# Patient Record
Sex: MALE | Race: WHITE | URBAN - METROPOLITAN AREA
[De-identification: names, ages, dates, MRNs, and addresses within clinical notes are randomized per-mention and may not be internally consistent; named-entity substitution may affect disease eponyms.]

---

## 2023-07-05 NOTE — LETTER
4/10/2025              Min Munoz Aisha        [address] [facility] [address]         To Whom it may concern    Patient sustained a work-related injury to his right ankle that requires surgery. He will be OFF WORK. We will schedule surgery and we will re-assess him.    Pleases contact us with any questions    Sincerely,    Ronan Kendrick MD          Document electronically generated by:  Ronan Kendrick MD               Patient refused

## 2024-08-14 ENCOUNTER — OFFICE VISIT (OUTPATIENT)
Dept: INTERNAL MEDICINE CLINIC | Facility: CLINIC | Age: 64
End: 2024-08-14
Payer: COMMERCIAL

## 2024-08-14 VITALS
HEIGHT: 68.5 IN | HEART RATE: 62 BPM | SYSTOLIC BLOOD PRESSURE: 132 MMHG | RESPIRATION RATE: 20 BRPM | BODY MASS INDEX: 34.01 KG/M2 | DIASTOLIC BLOOD PRESSURE: 86 MMHG | TEMPERATURE: 98 F | WEIGHT: 227 LBS

## 2024-08-14 DIAGNOSIS — H61.23 BILATERAL IMPACTED CERUMEN: ICD-10-CM

## 2024-08-14 DIAGNOSIS — G47.33 OSA (OBSTRUCTIVE SLEEP APNEA): ICD-10-CM

## 2024-08-14 DIAGNOSIS — Z00.00 ROUTINE PHYSICAL EXAMINATION: Primary | ICD-10-CM

## 2024-08-14 DIAGNOSIS — M51.9 LUMBAR DISC DISEASE: ICD-10-CM

## 2024-08-14 DIAGNOSIS — Z98.1 HISTORY OF LUMBAR FUSION: ICD-10-CM

## 2024-08-14 DIAGNOSIS — Z12.11 COLON CANCER SCREENING: ICD-10-CM

## 2024-08-14 DIAGNOSIS — F41.9 ANXIETY: ICD-10-CM

## 2024-08-14 DIAGNOSIS — H81.10 BENIGN PAROXYSMAL POSITIONAL VERTIGO, UNSPECIFIED LATERALITY: ICD-10-CM

## 2024-08-14 PROCEDURE — 3075F SYST BP GE 130 - 139MM HG: CPT | Performed by: INTERNAL MEDICINE

## 2024-08-14 PROCEDURE — 99386 PREV VISIT NEW AGE 40-64: CPT | Performed by: INTERNAL MEDICINE

## 2024-08-14 PROCEDURE — 3079F DIAST BP 80-89 MM HG: CPT | Performed by: INTERNAL MEDICINE

## 2024-08-14 PROCEDURE — 3008F BODY MASS INDEX DOCD: CPT | Performed by: INTERNAL MEDICINE

## 2024-08-14 RX ORDER — DIAZEPAM 10 MG/1
1 TABLET ORAL 3 TIMES DAILY PRN
COMMUNITY
Start: 2024-05-13 | End: 2024-08-14

## 2024-08-14 RX ORDER — TRAMADOL HYDROCHLORIDE 50 MG/1
50 TABLET ORAL
Qty: 150 TABLET | Refills: 1 | Status: SHIPPED | OUTPATIENT
Start: 2024-08-14

## 2024-08-14 RX ORDER — TRAMADOL HYDROCHLORIDE 50 MG/1
50 TABLET ORAL
COMMUNITY
Start: 2024-08-12 | End: 2024-08-14

## 2024-08-14 RX ORDER — DIAZEPAM 10 MG/1
10 TABLET ORAL 3 TIMES DAILY PRN
Qty: 30 TABLET | Refills: 1 | Status: SHIPPED | OUTPATIENT
Start: 2024-08-14

## 2024-08-14 NOTE — PROGRESS NOTES
HPI:    Patient ID: Min Leonard is a 63 year old male.    HPI   Patient is here to establish care with new primary care physician.  We do have notes available from the previous doctor, Dr. Miguel Karimi. some patient has more disc disease with history of couple of surgeries back in 2004 including a fusion.  Current medications reviewed.  Also with depression, anxiety, sleep apnea. Pt had to change insurance. No EtOH since 1989.     He does not follow woth back or pain doctor. Takes the tramadol daily 5 pills; doing that level for a couple years. He has been on hydrocodone in the past but wants to avoid that now. He is physically active at his work; gets 10K steps a day. He gets vertigo sometimes when he is leaning way back; only happens with certain movements. Episodes last 10-30 seconds. He takes the diazepam for anxiety; history of panic and anxiety dating back for years. Family hx of anxiety with both parents and one sister. Takes the valium 1-2 pills a day; He has been on different anti-depressants but not helpful in the past- Prozac and 4-5 others.     Diet is good. Smoothie for breakfast. Takes a vitamin. He has teeth issues- most will need to be pulled. He has sleep apnea; it improved with a dental brace/appliance- he is not using ti now. Never tolerated CPAP. No tobacco in 2004 at time of fusion. Colonoscopy in Oct 2014.     There is no problem list on file for this patient.         HISTORY:  No past medical history on file.   No past surgical history on file.   No family history on file.   Social History     Socioeconomic History    Marital status:    Tobacco Use    Smoking status: Never    Smokeless tobacco: Never   Substance and Sexual Activity    Alcohol use: Not Currently    Drug use: Never    Sexual activity: Yes     Partners: Female     Social Determinants of Health     Food Insecurity: Low Risk  (9/13/2022)    Received from [facility]    Food Insecurity     Have  there been times that your food ran out, and you didn't have money to get more?: No     Are there times that you worry that this might happen?: No   Transportation Needs: Low Risk  (9/13/2022)    Received from [facility]    Transportation Needs     Do you have trouble getting transportation to medical appointments?: No   Housing Stability: Low Risk  (9/13/2022)    Received from [facility]    Housing Stability     Are you concerned about having a safe and reliable place to live?: No          Review of Systems          Current Outpatient Medications   Medication Sig Dispense Refill    diazePAM 10 MG Oral Tab Take 1 tablet (10 mg total) by mouth 3 (three) times daily as needed.      traMADol 50 MG Oral Tab Take 1 tablet (50 mg total) by mouth 5 (five) times daily.       Allergies:No Known Allergies     PHYSICAL EXAM:   /86 (BP Location: Right arm, Patient Position: Sitting, Cuff Size: large)   Pulse 62   Temp 97.8 °F (36.6 °C) (Other)   Resp 20   Ht 5' 8.5\" (1.74 m)   Wt 227 lb (103 kg)   BMI 34.01 kg/m²      Physical Exam  Constitutional:       Appearance: Normal appearance. He is well-developed. He is obese.   HENT:      Right Ear: There is impacted cerumen.      Left Ear: There is impacted cerumen.      Nose: Nose normal.      Mouth/Throat:      Pharynx: No oropharyngeal exudate or posterior oropharyngeal erythema.   Eyes:      Conjunctiva/sclera: Conjunctivae normal.   Neck:      Vascular: No carotid bruit.   Cardiovascular:      Rate and Rhythm: Normal rate and regular rhythm.      Pulses: Normal pulses.      Heart sounds: Normal heart sounds. No murmur heard.  Pulmonary:      Effort: Pulmonary effort is normal.      Breath sounds: Normal breath sounds. No wheezing or rales.   Abdominal:      General: Bowel sounds are normal.      Palpations: Abdomen is soft. There is no mass.      Tenderness: There is no abdominal tenderness.      Hernia: There is no hernia  in the left inguinal area.   Genitourinary:     Penis: Normal and circumcised.       Testes: Normal.   Musculoskeletal:      Right lower leg: No edema.      Left lower leg: No edema.   Lymphadenopathy:      Cervical: No cervical adenopathy.   Skin:     General: Skin is warm and dry.      Findings: No rash.   Neurological:      General: No focal deficit present.      Mental Status: He is alert.   Psychiatric:         Mood and Affect: Mood normal.         Behavior: Behavior normal.         Thought Content: Thought content normal.          Wt Readings from Last 6 Encounters:   08/14/24 227 lb (103 kg)             ASSESSMENT/PLAN:   1. Routine physical examination  Physical exam remarkable for overweight status as well as poor dentition.  Current medications reviewed.  Health maintenance and vaccination status reviewed.  Encouraged healthy diet and regular exercise.  Try to lose some weight.  He is going to contact us in the coming days and let us know where he will get his blood work drawn at.  After that we will generate the order for full blood test.    2. Lumbar disc disease  History of fusion.  He still has pain but remains quite active.  He takes tramadol 5 pills a day.  Discussed other possible treatments.  We will give him a refill at this time.  We will keep you need to keep close eye to make sure that his level of use does not increase.  - traMADol 50 MG Oral Tab; Take 1 tablet (50 mg total) by mouth 5 (five) times daily.  Dispense: 150 tablet; Refill: 1    3. History of lumbar fusion  As above.  - traMADol 50 MG Oral Tab; Take 1 tablet (50 mg total) by mouth 5 (five) times daily.  Dispense: 150 tablet; Refill: 1    4. Anxiety  Ongoing chronic anxiety with history of panic attacks.  States has been on numerous medications such as Prozac and related pills and they have not been helpful.  We will continue the diazepam.  He takes 1 a day.  - diazePAM 10 MG Oral Tab; Take 1 tablet (10 mg total) by mouth 3 (three)  times daily as needed.  Dispense: 30 tablet; Refill: 1    5. CARLOS (obstructive sleep apnea)  Patient did not tolerate CPAP.    6. Colon cancer screening  Patient is due for colon cancer screening.  Given referral for GI telephone colon screening process.  - [facility] GI Telephone Colon Screen    7. Bilateral impacted cerumen  Patient can return at some point if he wishes to have the ears flushed out.  He is going to try over-the-counter Debrox.  Patient with occasional    8. Benign paroxysmal positional vertigo, unspecified laterality  Brief episodes of vertigo.  Triggered by very precise movements that he does not do all the time.  Discussed the nature of positional vertigo.  I think it is unlikely that clearing the wax will help with the vertigo.  I do not think he needs any medications or physical therapy at this time.  Try to avoid the specific types of movement that trigger this condition.         Meds This Visit:  Requested Prescriptions     Pending Prescriptions Disp Refills    diazePAM 10 MG Oral Tab 90 tablet 1     Sig: Take 1 tablet (10 mg total) by mouth 3 (three) times daily as needed.    traMADol 50 MG Oral Tab 90 tablet 1     Sig: Take 1 tablet (50 mg total) by mouth 5 (five) times daily.       Imaging & Referrals:  None         Min Chaney MD

## 2024-08-15 ENCOUNTER — TELEPHONE (OUTPATIENT)
Dept: INTERNAL MEDICINE CLINIC | Facility: CLINIC | Age: 64
End: 2024-08-15

## 2024-08-15 DIAGNOSIS — Z00.00 ROUTINE PHYSICAL EXAMINATION: Primary | ICD-10-CM

## 2024-08-15 NOTE — TELEPHONE ENCOUNTER
Dr. Chaney Patient stated that he did call his insurance and his lab orders need to go to [email or web address]. Patient also stated that they have to be coded routine or preventative not diagnostic. Also for his colonoscopy it needs to be routine or preventive.     Ok to sent patient a [employer] message once the order has been signed.

## 2024-08-16 NOTE — TELEPHONE ENCOUNTER
Spoke with patient, Date of Birth verified  Patient was informed of MD recommendation, he stated understanding.

## 2024-08-25 LAB
ABSOLUTE BASOPHILS: 57 CELLS/UL (ref 0–200)
ABSOLUTE EOSINOPHILS: 405 CELLS/UL (ref 15–500)
ABSOLUTE LYMPHOCYTES: 2911 CELLS/UL (ref 850–3900)
ABSOLUTE MONOCYTES: 440 CELLS/UL (ref 200–950)
ABSOLUTE NEUTROPHILS: 3287 CELLS/UL (ref 1500–7800)
ALBUMIN/GLOBULIN RATIO: 1.4 (CALC) (ref 1–2.5)
ALBUMIN: 4.3 G/DL (ref 3.6–5.1)
ALKALINE PHOSPHATASE: 52 U/L (ref 35–144)
ALT: 16 U/L (ref 9–46)
AST: 16 U/L (ref 10–35)
BASOPHILS: 0.8 %
BILIRUBIN, TOTAL: 0.4 MG/DL (ref 0.2–1.2)
BUN: 16 MG/DL (ref 7–25)
CALCIUM: 9.6 MG/DL (ref 8.6–10.3)
CARBON DIOXIDE: 29 MMOL/L (ref 20–32)
CHLORIDE: 104 MMOL/L (ref 98–110)
CHOL/HDLC RATIO: 3.3 (CALC)
CHOLESTEROL, TOTAL: 205 MG/DL
CREATININE: 0.84 MG/DL (ref 0.7–1.35)
EGFR: 99 ML/MIN/1.73M2
EOSINOPHILS: 5.7 %
GLOBULIN: 3 G/DL (CALC) (ref 1.9–3.7)
GLUCOSE: 102 MG/DL (ref 65–99)
HDL CHOLESTEROL: 63 MG/DL
HEMATOCRIT: 43.5 % (ref 38.5–50)
HEMOGLOBIN: 14.5 G/DL (ref 13.2–17.1)
LDL-CHOLESTEROL: 122 MG/DL (CALC)
LYMPHOCYTES: 41 %
MCH: 31 PG (ref 27–33)
MCHC: 33.3 G/DL (ref 32–36)
MCV: 93.1 FL (ref 80–100)
MONOCYTES: 6.2 %
MPV: 9.8 FL (ref 7.5–12.5)
NEUTROPHILS: 46.3 %
NON-HDL CHOLESTEROL: 142 MG/DL (CALC)
PLATELET COUNT: 295 THOUSAND/UL (ref 140–400)
POTASSIUM: 4.3 MMOL/L (ref 3.5–5.3)
PROTEIN, TOTAL: 7.3 G/DL (ref 6.1–8.1)
PSA, TOTAL: 1.74 NG/ML
RDW: 12.8 % (ref 11–15)
RED BLOOD CELL COUNT: 4.67 MILLION/UL (ref 4.2–5.8)
SODIUM: 139 MMOL/L (ref 135–146)
TRIGLYCERIDES: 102 MG/DL
TSH W/REFLEX TO FT4: 1.17 MIU/L (ref 0.4–4.5)
WHITE BLOOD CELL COUNT: 7.1 THOUSAND/UL (ref 3.8–10.8)

## 2024-08-26 ENCOUNTER — NURSE ONLY (OUTPATIENT)
Facility: CLINIC | Age: 64
End: 2024-08-26

## 2024-08-26 DIAGNOSIS — Z12.11 COLON CANCER SCREENING: Primary | ICD-10-CM

## 2024-08-26 DIAGNOSIS — Z86.010 HISTORY OF COLON POLYPS: ICD-10-CM

## 2024-08-26 NOTE — PROGRESS NOTES
GI Staff:  [facility] Colon Screening Orders    Please schedule: Colonoscopy 81391 with MAC OR IV (if appropriate)    Please send split dose Golytely bowel prep     Diagnosis: Colon Screening Z12.11 / History of Colon polyps  Z86.010     Medication adjustments: none   Day before procedure, hold:  Day of procedure, hold:     >>>Please inform patient if new medications are started after scheduling procedure they need to call clinic to notify us.

## 2024-08-26 NOTE — PROGRESS NOTES
*Provider per rotation (if necessary)*    Called patient for scheduled telephone colon screening/positive FIT result.   Medications, pharmacy, and allergies verified with the patient.   **Please advise on colonoscopy and bowel prep orders**.     Age 45-64 y/o (Y/N):   66-76 y/o ? Route to GI provider per rotation schedule   › GI MD preference: None   › Insurance:  BLUE CROSS OUT OF STATE   › Last PCP Visit:Min Chaney MD  8/14/2024  IF NO PCP within [facility] system ? Not [facility]/FIT Eligible   › Last CBC drawn: 8/24/24  › Date of positive FIT test: n/a  › H/W/BMI:  5'8.5\" / 227 lbs / 34.01      Special comments/notes:  Recall details:     Last Procedure, Date, MD:     10/22/2014  Edilberto Mabry MD    Last diagnosis: Colon screen , Colon polyps    Recalled for (mth/yrs):  3 years (10/22/2017)   Sedation used previously:  Conscious Sedation    Last Prep Used:    Quality of Prep:      Telephone Colon Screening Questionnaire Yes No   Are you currently experiencing any new/abnormal GI symptoms? [] [x]   If yes, explain:     Rectal bleeding? [] [x]   Black stool? [] [x]   Dysphagia or food \"feeling stuck\" when eating? [] [x]   Intractable vomiting? [] [x]   Unexplained weight loss? [] [x]   First colonoscopy? [] [x]   Family history of colon cancer? [] [x]   Any issues with anesthesia? [] [x]   If yes, explain:      Have you had a stroke, heart attack or stent placement in the last 12 months?  [] [x]   If yes ? GI in-person consultation      Personal history of resp. Issues/oxygen use/CALROS/COPD [x] []   If yes, CPAP/BiPAP? Patient reported No CPAP (Mouth Guard)      History of devices (pacemaker/defibrillator) [] [x]   History of cardiac/CVA issues/(MI/stroke) (see above) [] [x]     Medication usage:  Yes  No   Anticoagulants:  Anticoagulant (Except Aspirin) ? Route to RN staff to obtain ordering provider orders [] [x]   Diabetic Meds:   PO DM Meds ? hold day prior and day of procedure  Insulin ? Route to RN clinical staff to  obtain provider orders  [] [x]   Weight loss meds (Phentermine/Vyvanse/Saxsenda/etc):  [] [x]   Iron/Herbal/Multivitamin Supplement (RX/OTC): [] [x]   Usage of marijuana, CBD &/or vape products: [] [x]

## 2024-11-06 DIAGNOSIS — F41.9 ANXIETY: ICD-10-CM

## 2024-11-06 DIAGNOSIS — Z98.1 HISTORY OF LUMBAR FUSION: ICD-10-CM

## 2024-11-06 DIAGNOSIS — M51.9 LUMBAR DISC DISEASE: ICD-10-CM

## 2024-11-09 RX ORDER — TRAMADOL HYDROCHLORIDE 50 MG/1
50 TABLET ORAL
Qty: 150 TABLET | Refills: 0 | Status: SHIPPED | OUTPATIENT
Start: 2024-11-09

## 2024-11-09 RX ORDER — DIAZEPAM 10 MG/1
10 TABLET ORAL 3 TIMES DAILY PRN
Qty: 30 TABLET | Refills: 0 | Status: SHIPPED | OUTPATIENT
Start: 2024-11-09

## 2024-11-09 NOTE — TELEPHONE ENCOUNTER
Please review. Protocol Failed; No Protocol    DIAZEPAM 10 MG:    Recent fills: 8/15/2024, 9/9/2024, 10/11/2024  Last Rx written: 8/14/2024  Last office visit: 8/14/2024    TRAMADOL 50 MG:      Recent fills: 8/14/2024, 10/11/2024  Last Rx written: 8/14/2024  Last office visit: 8/14/2024    Requested Prescriptions   Pending Prescriptions Disp Refills    DIAZEPAM 10 MG Oral Tab [Pharmacy Med Name: diazePAM Oral Tablet 10 MG] 30 tablet 0     Sig: TAKE ONE TABLET BY MOUTH THREE TIMES DAILY AS NEEDED       Controlled Substance Medication Failed - 11/9/2024 12:13 PM        Failed - This medication is a controlled substance - forward to provider to refill          TRAMADOL 50 MG Oral Tab [Pharmacy Med Name: traMADol HCl Oral Tablet 50 MG] 150 tablet 0     Sig: TAKE 1 TABLET BY MOUTH FIVE TIMES A DAY       Controlled Substance Medication Failed - 11/9/2024 12:13 PM        Failed - This medication is a controlled substance - forward to provider to refill               Future Appointments         Provider Department Appt Notes    In 3 weeks [facility], [facility]          Recent Outpatient Visits              2 months ago Colon cancer screening    [facility]    Nurse Only    2 months ago Routine physical examination    [facility]aney [facility]    Office Visit

## 2024-11-25 RX ORDER — MULTIVITAMIN
1 TABLET ORAL DAILY
COMMUNITY

## 2024-12-04 ENCOUNTER — ANESTHESIA (OUTPATIENT)
Dept: ENDOSCOPY | Facility: HOSPITAL | Age: 64
End: 2024-12-04
Payer: COMMERCIAL

## 2024-12-04 ENCOUNTER — ANESTHESIA EVENT (OUTPATIENT)
Dept: ENDOSCOPY | Facility: HOSPITAL | Age: 64
End: 2024-12-04
Payer: COMMERCIAL

## 2024-12-04 ENCOUNTER — HOSPITAL ENCOUNTER (OUTPATIENT)
Facility: HOSPITAL | Age: 64
Setting detail: HOSPITAL OUTPATIENT SURGERY
Discharge: HOME | End: 2024-12-04
Attending: INTERNAL MEDICINE | Admitting: INTERNAL MEDICINE
Payer: COMMERCIAL

## 2024-12-04 DIAGNOSIS — Z12.11 COLON CANCER SCREENING: ICD-10-CM

## 2024-12-04 DIAGNOSIS — Z86.0100 HISTORY OF COLON POLYPS: ICD-10-CM

## 2024-12-04 PROCEDURE — 0DBK8ZX EXCISION OF ASCENDING COLON, VIA NATURAL OR ARTIFICIAL OPENING ENDOSCOPIC, DIAGNOSTIC: ICD-10-PCS | Performed by: INTERNAL MEDICINE

## 2024-12-04 PROCEDURE — 45380 COLONOSCOPY AND BIOPSY: CPT | Performed by: INTERNAL MEDICINE

## 2024-12-04 RX ORDER — SODIUM CHLORIDE 0.9 % (FLUSH) 0.9 %
10 SYRINGE (ML) INJECTION AS NEEDED
Status: DISCONTINUED | OUTPATIENT
Start: 2024-12-04 | End: 2024-12-04

## 2024-12-04 RX ORDER — SODIUM CHLORIDE, SODIUM LACTATE, POTASSIUM CHLORIDE, CALCIUM CHLORIDE 600; 310; 30; 20 MG/100ML; MG/100ML; MG/100ML; MG/100ML
INJECTION, SOLUTION INTRAVENOUS CONTINUOUS
Status: DISCONTINUED | OUTPATIENT
Start: 2024-12-04 | End: 2024-12-04

## 2024-12-04 RX ORDER — MIDAZOLAM HYDROCHLORIDE 1 MG/ML
1 INJECTION INTRAMUSCULAR; INTRAVENOUS EVERY 5 MIN PRN
Status: DISCONTINUED | OUTPATIENT
Start: 2024-12-04 | End: 2024-12-04

## 2024-12-04 RX ORDER — LIDOCAINE HYDROCHLORIDE 10 MG/ML
INJECTION, SOLUTION EPIDURAL; INFILTRATION; INTRACAUDAL; PERINEURAL AS NEEDED
Status: DISCONTINUED | OUTPATIENT
Start: 2024-12-04 | End: 2024-12-04 | Stop reason: SURG

## 2024-12-04 RX ORDER — NALOXONE HYDROCHLORIDE 0.4 MG/ML
0.08 INJECTION, SOLUTION INTRAMUSCULAR; INTRAVENOUS; SUBCUTANEOUS ONCE AS NEEDED
Status: DISCONTINUED | OUTPATIENT
Start: 2024-12-04 | End: 2024-12-04

## 2024-12-04 RX ADMIN — LIDOCAINE HYDROCHLORIDE 50 MG: 10 INJECTION, SOLUTION EPIDURAL; INFILTRATION; INTRACAUDAL; PERINEURAL at 11:39:00

## 2024-12-04 NOTE — OPERATIVE REPORT
COLONOSCOPY REPORT    Min Leonard     10/3/1960 Age 64 year old   PCP Min Chaney MD Endoscopist Silvana Christianson MD     Date of procedure: 24    Procedure: Colonoscopy w/biopsy    Pre-operative diagnosis: screening    Post-operative diagnosis: see impression    Medications: MAC    Withdrawal time: 16 minutes    Procedure:  Informed consent was obtained from the patient after the risks of the procedure were discussed, including but not limited to bleeding, perforation, aspiration, infection, or possibility of a missed lesion. After discussions of the risks/benefits and alternatives to this procedure, as well as the planned sedation, the patient was placed in the left lateral decubitus position and begun on continuous blood pressure pulse oximetry and EKG monitoring and this was maintained throughout the procedure. Once an adequate level of sedation was obtained a digital rectal exam was completed. Then the lubricated tip of the [id number] diagnostic video colonoscope was inserted and advanced without difficulty to the cecum using the CO2 insufflation technique. The cecum was identified by localizing the trifold, the appendix and the ileocecal valve. Withdrawal was begun with thorough washing and careful examination of the colonic walls and folds. A routine second examination of the cecum/ascending colon was performed. Retroflexion was performed in the rectum. Photodocumentation was obtained. [place] bowel prep score of 6 (Right colon-2; Transverse colon-2, Left colon-2). I then carefully withdrew the instrument from the patient who tolerated the procedure well.     Complications: none.    Findings:   -- KEV: normal rectal tone, no masses palpated.     -- 1 polyp(s) noted as follows:      A. 2 mm polyp in the ascending colon; sessile morphology; cold forceps polypectomy and retrieved.    -- Diverticulosis: mild in the sigmoid colon.    -- A retroflexed view of the rectum revealed no  abnormalities.    -- The colonic mucosa throughout the colon showed normal vascular pattern, without evidence of angioectasias or inflammation.     Impression:   One diminutive polyp resected and retrieved  diverticulosis    Recommend:  Pending pathology, repeat colonoscopy in 5 years given history of polyps  High fiber diet    >>>If tissue was obtained and you have not received your pathology results either by phone or letter within 2 weeks, please call our office at [phone].    Specimens: polyp    Blood loss: <1 ml      Silvana Christianson MD  [facility]

## 2024-12-04 NOTE — DISCHARGE INSTRUCTIONS
Home Care Instructions for Colonoscopy  Diet:  - Resume your regular diet as tolerated unless otherwise instructed.  - Start with light meals to minimize bloating.  - Do not drink alcohol today.    Medication:  - If you have questions about resuming your normal medications, please contact your Primary Care Physician.    Activities:  - Take it easy today. Do not return to work today.  - Do not drive today.  - Do not operate any machinery today (including kitchen equipment).    Colonoscopy:  - You may notice some rectal \"spotting\" (a little blood on the toilet tissue) for a day or two after the exam. This is normal.  - If you experience any rectal bleeding (not spotting), persistent tenderness or sharp severe abdominal pains, oral temperature over 100 degrees Fahrenheit, light-headedness or dizziness, or any other problems, contact your doctor.      **If unable to reach your doctor, please go to the [facility] Emergency Room**    - Your referring physician will receive a full report of your examination.  - If you do not hear from your doctor's office within two weeks of your biopsy, please call them for your results.    You may be able to see your laboratory results in [employer] between 4 and 7 business days.  In some cases, your physician may not have viewed the results before they are released to [employer].  If you have questions regarding your results contact the physician who ordered the test/exam by phone or via [employer] by choosing \"Ask a Medical Question.\"

## 2024-12-04 NOTE — ANESTHESIA PREPROCEDURE EVALUATION
Anesthesia PreOp Note    HPI:     Min Leonard is a 64 year old male who presents for preoperative consultation requested by: Silvana Christianson MD    Date of Surgery: 12/4/2024    Procedure(s):  COLONOSCOPY  Indication: Colon cancer screening/ History of colon polyps    Relevant Problems   No relevant active problems       NPO:  Last Liquid Consumption Date: 12/04/24  Last Liquid Consumption Time: 0930  Last Solid Consumption Date: 12/03/24  Last Solid Consumption Time: 0830  Last Liquid Consumption Date: 12/04/24          History Review:  There are no active problems to display for this patient.      Past Medical History:    Sleep apnea    Visual impairment       Past Surgical History:   Procedure Laterality Date    Colonoscopy  10/22/2014    Edilberto Mabry MD       Prescriptions Prior to Admission[1]  Current Medications and Prescriptions Ordered in Epic[2]    Allergies[3]    Family History   Problem Relation Age of Onset    Colon Cancer Neg     Colon Polyps Neg      Social History     Socioeconomic History    Marital status:    Tobacco Use    Smoking status: Former     Types: Cigarettes    Smokeless tobacco: Never    Tobacco comments:     Quit 2003   Substance and Sexual Activity    Alcohol use: Not Currently    Drug use: Never    Sexual activity: Yes     Partners: Female       Available pre-op labs reviewed.             Vital Signs:  Body mass index is 32.75 kg/m².   height is 1.765 m (5' 9.5\") and weight is 102.1 kg (225 lb). His blood pressure is 139/93 (abnormal) and his pulse is 65. His respiration is 12 and oxygen saturation is 96%.   Vitals:    11/25/24 1626 12/04/24 1002   BP:  (!) 139/93   Pulse:  65   Resp:  12   SpO2:  96%   Weight: 102.1 kg (225 lb)    Height: 1.765 m (5' 9.5\")         Anesthesia Evaluation     Patient summary reviewed and Nursing notes reviewed    No history of anesthetic complications   Airway   Mallampati: III  TM distance: >3 FB  Neck ROM: full  Dental       Comment: Poor dentition      Pulmonary - normal exam    breath sounds clear to auscultation  (+) sleep apnea  Cardiovascular - negative ROS and normal exam  Exercise tolerance: good    Rhythm: regular  Rate: normal    Neuro/Psych - negative ROS     GI/Hepatic/Renal - negative ROS   (+) GERD well controlled, bowel prep    Endo/Other - negative ROS   Abdominal  - normal exam  (+) obese                 Anesthesia Plan:   ASA:  3  Plan:   MAC  Informed Consent Plan and Risks Discussed With:  Patient  Use of Blood Products Discussed With:  Patient  Discussed plan with:  CRNA and surgeon      I have informed Min Leonard and/or legal guardian or family member of the nature of the anesthetic plan, benefits, risks including possible dental damage if relevant, major complications, and any alternative forms of anesthetic management.   All of the patient's questions were answered to the best of my ability. The patient desires the anesthetic management as planned.  Emily Bustamante CRNA  12/4/2024 11:35 AM  Present on Admission:  **None**           [1]   Medications Prior to Admission   Medication Sig Dispense Refill Last Dose/Taking    Multiple Vitamin (ONE-DAILY MULTI VITAMINS) Oral Tab Take 1 tablet by mouth daily.   11/30/2024    diazePAM 10 MG Oral Tab Take 1 tablet (10 mg total) by mouth 3 (three) times daily as needed. 30 tablet 0 12/3/2024    traMADol 50 MG Oral Tab Take 1 tablet (50 mg total) by mouth 5 (five) times daily. 150 tablet 0 12/4/2024 Morning   [2]   Current Facility-Administered Medications Ordered in Epic   Medication Dose Route Frequency Provider Last Rate Last Admin    sodium chloride 0.9% 0.9% flush injection 10 mL  10 mL Intravenous PRN Silvana Christianson MD        lactated ringers infusion   Intravenous Continuous Silvana Christianson  mL/hr at 12/04/24 1009 New Bag at 12/04/24 1009    midazolam (Versed) 2 MG/2ML injection 1 mg  1 mg Intravenous Q5 Min PRN Silvana Christianson MD         fentaNYL (Sublimaze) 50 mcg/mL injection 25 mcg  25 mcg Intravenous Q5 Min PRN Silvana Christianson MD         No current [facility]-ordered outpatient medications on file.   [3] No Known Allergies

## 2024-12-04 NOTE — ANESTHESIA POSTPROCEDURE EVALUATION
Patient: Min Leonadr    Procedure Summary       Date: 12/04/24 Room / Location: [facility] ENDOSCOPY 01 / [facility] ENDOSCOPY    Anesthesia Start: 1137 Anesthesia Stop: 1202    Procedure: COLONOSCOPY Diagnosis:       Colon cancer screening      History of colon polyps      (Diverticulosis, colon polyp)    Surgeons: Silvana Christianson MD Anesthesiologist: Emily Bustamante CRNA    Anesthesia Type: MAC ASA Status: 3            Anesthesia Type: MAC    Vitals Value Taken Time    64 12/04/24 1202   Temp 97 12/04/24 1202   Pulse 66 12/04/24 1201   Resp 10 12/04/24 1201   SpO2 95 % 12/04/24 1201   Vitals shown include unfiled device data.    [facility] AN Post Evaluation:   Patient Evaluated in PACU  Patient Participation: complete - patient participated  Level of Consciousness: awake and alert  Pain Score: 0  Pain Management: adequate  Airway Patency:patent  Yes    Nausea/Vomiting: none  Cardiovascular Status: acceptable  Respiratory Status: acceptable  Postoperative Hydration acceptable      Emily Bustamante CRNA  12/4/2024 12:02 PM

## 2024-12-04 NOTE — H&P
History & Physical Examination    Patient Name: Mni Leonard  MRN: [id number]  CSN: [id number]  YOB: 1960    Diagnosis: crc screening    Prescriptions Prior to Admission[1]  Current Facility-Administered Medications   Medication Dose Route Frequency    sodium chloride 0.9% 0.9% flush injection 10 mL  10 mL Intravenous PRN    lactated ringers infusion   Intravenous Continuous    midazolam (Versed) 2 MG/2ML injection 1 mg  1 mg Intravenous Q5 Min PRN    fentaNYL (Sublimaze) 50 mcg/mL injection 25 mcg  25 mcg Intravenous Q5 Min PRN       Allergies: Allergies[2]    Past Medical History:    Sleep apnea    Visual impairment     Past Surgical History:   Procedure Laterality Date    Colonoscopy  10/22/2014    Edilberto Mabry MD     Family History   Problem Relation Age of Onset    Colon Cancer Neg     Colon Polyps Neg      Social History     Tobacco Use    Smoking status: Former     Types: Cigarettes    Smokeless tobacco: Never    Tobacco comments:     Quit 2003   Substance Use Topics    Alcohol use: Not Currently         SYSTEM Check if Review is Normal Check if Physical Exam is Normal If not normal, please explain:   HEENT [X ] [ X]    NECK  [X ] [ X]    HEART [X ] [ X]    LUNGS [X ] [ X]    ABDOMEN [X ] [ X]    EXTREMITIES [X ] [ X]    OTHER        I have discussed the risks and benefits and alternatives of the procedure with the patient/family.  They understand and agree to proceed with plan of care.   I have reviewed the History and Physical done within the last 30 days.  Any changes noted above.    Silvana Christianson MD  [facility]  12/4/2024  10:21 AM               [1]   Medications Prior to Admission   Medication Sig Dispense Refill Last Dose/Taking    Multiple Vitamin (ONE-DAILY MULTI VITAMINS) Oral Tab Take 1 tablet by mouth daily.   11/30/2024    diazePAM 10 MG Oral Tab Take 1 tablet (10 mg total) by mouth 3 (three) times daily as needed. 30 tablet 0 12/3/2024     traMADol 50 MG Oral Tab Take 1 tablet (50 mg total) by mouth 5 (five) times daily. 150 tablet 0 12/4/2024 Morning   [2] No Known Allergies

## 2024-12-05 VITALS
WEIGHT: 225 LBS | SYSTOLIC BLOOD PRESSURE: 131 MMHG | BODY MASS INDEX: 32.58 KG/M2 | HEIGHT: 69.5 IN | DIASTOLIC BLOOD PRESSURE: 81 MMHG | OXYGEN SATURATION: 96 % | RESPIRATION RATE: 13 BRPM | HEART RATE: 58 BPM

## 2024-12-09 ENCOUNTER — MED REC SCAN ONLY (OUTPATIENT)
Facility: CLINIC | Age: 64
End: 2024-12-09

## 2024-12-09 DIAGNOSIS — M51.9 LUMBAR DISC DISEASE: ICD-10-CM

## 2024-12-09 DIAGNOSIS — Z98.1 HISTORY OF LUMBAR FUSION: ICD-10-CM

## 2024-12-09 DIAGNOSIS — F41.9 ANXIETY: ICD-10-CM

## 2024-12-10 RX ORDER — TRAMADOL HYDROCHLORIDE 50 MG/1
50 TABLET ORAL
Qty: 150 TABLET | Refills: 0 | OUTPATIENT
Start: 2024-12-10

## 2024-12-10 RX ORDER — DIAZEPAM 10 MG/1
10 TABLET ORAL 3 TIMES DAILY PRN
Qty: 30 TABLET | Refills: 0 | OUTPATIENT
Start: 2024-12-10

## 2024-12-10 NOTE — TELEPHONE ENCOUNTER
This medication called the [facility] to verify if patient picked up the Tramadol prescription written 8/14/24 for #150 x 30 days. Patient also filled at [facility] 8/12/24.    Spoke with the pharmacist, Myles. She states that the prescription written 8/14/2024 for #150 x 30 days was picked up 8/16/24.    The pharmacist stated the patient did use his [employer] insurance to pick his prescription up.    I did advise the pharmacist that patient also filled a 30 day supply at [facility] 8/12/24.    I verified with the pharmacist, and she states that they do use the [facility]. Not sure how it got missed.    Advised the pharmacist that I will bring this information to the provider's attention.

## 2024-12-10 NOTE — TELEPHONE ENCOUNTER
Dr. Chaney -     I have a safety concern regarding this patient's tramadol usage.  Please also review how frequently the patient should be taking Valium.    Please advise this patient is refilling his tramadol prescription inappropriately.     **PLEASE ADVISE INFORMATION BELOW - PATIENT IS REFILLING HIS TRAMADOL AT 2 PHARMACIES ([facility] & [facility]) PATIENT FILLED TWICE IN AUGUST 2024, 2 DAYS APART.    Per office visit with Dr. Chaney   History of fusion.  He still has pain but remains quite active.  He takes tramadol 5 pills a day.  Discussed other possible treatments.  We will give him a refill at this time.  We will keep you need to keep close eye to make sure that his level of use does not increase.  He takes the diazepam for anxiety; history of panic and anxiety dating back for years   Takes the valium 1-2 pills a day   We will continue the diazepam.  He takes 1 a day.

## 2024-12-10 NOTE — TELEPHONE ENCOUNTER
Patient requesting for refill on medications and if possible more than 1 month supply at a time.  Patient states he is out of medication.   Routed to Refill team.

## 2024-12-10 NOTE — TELEPHONE ENCOUNTER
This medication technician is calling [facility] to verify patient's tramadol dispense history, as patient filled a 30 day supply through [facility] 8/16/24.    Spoke with Rosanna, Pharmacist and she confirmed his last dispense date, which was 11/11/24 for #150 x 30 days.    I asked Rosanna if she could verify the prescription written on 8/12/24 #150 x 30 days by Dr. Miguel Colindres. Rosanan confirmed this information.    Other information below was also confirmed with the pharmacist:     - That patient did in fact pick this one up on 8/12/24     - Confirmed that patient ALWAYS uses a discount card, not insurance.    The  pharmacist stated that they are aware of this patient and keep an eye his dispense history for controls/narcotics.    I advised the Pharmacist that will bring this information to the providers attention.

## 2024-12-10 NOTE — TELEPHONE ENCOUNTER
8/12/24: Patient refilled and picked up at [facility] for tramadol 50 mg prescription written by Dr. Moffett for a full qty of #150 x 30 days (prescription written 8/12/24)       - THE PHARMACIST STATES THIS PATIENT ALWAYS USES A DISCOUNT CARD, NEVER INSURANCE.      8/16/24: Patient refilled and picked up at [facility] for tramadol 50 mg prescription written by Dr. Chaney for a full qty #150 x 30 day (prescription written on 8/14/24)      - THE PHARMACIST STATES THAT PATIENT USED HIS [employer] INSURANCE TO FILL THIS PRESCRIPTION.        TRAMADOL 50 mg:  Recent fill dates:   11/11/24, 10/11/24, and 8/14/24 (Written by Dr. Chaney)  Other fill dates:  8/12/24, 7/15/24, and 6/17/24 (Written by Dr. Miguel Moffett)  Date of  last written prescription:      Prescription written on 11/9/24 for qty of: #150 each (processed as a 30 day supply)       - Taken 5 times daily as needed    TraMADol HCl Dispensed Written Strength Quantity Refills Days Supply Provider Pharmacy   TRAMADOL HCL 11/11/2024 11/09/2024 50 mg 150  30 NAYELI CHANEY MD [facility]   TRAMADOL HCL 10/11/2024 08/14/2024 50 mg 150  30 NAYELI CHANEY MD [facility]   TRAMADOL HCL 08/14/2024 08/14/2024 50 mg 150  30 NAYELI CHANEY MD [facility]   TRAMADOL HCL 08/12/2024 08/12/2024 50 mg 150  30 MIGUEL MOFFETT DO [facility]      _______________________________________________________________________________________    Diazepam 10 mg:   Recent fill dates:   11/11/24, 10/11/24, and 9/9/24 (Written for #90 x 30 days - Dr. Moffett). Patient also refilled on 8/15/24 (written for #30 x 10 days - Dr. Chaney)  Date of  last written prescription:      Prescription written on 11/9/24 for qty of: #30 each (processed as a 10 day supply)       - Taken three times daily as needed    Requested Prescriptions     Pending Prescriptions Disp Refills    TRAMADOL 50 MG Oral Tab [Pharmacy Med Name: traMADol HCl Oral Tablet 50 MG] 150 tablet 0     Sig: Take 1 tablet (50 mg  total) by mouth 5 (five) times daily.    DIAZEPAM 10 MG Oral Tab [Pharmacy Med Name: diazePAM Oral Tablet 10 MG] 30 tablet 0     Sig: Take 1 tablet (10 mg total) by mouth 3 (three) times daily as needed.

## 2024-12-10 NOTE — TELEPHONE ENCOUNTER
Sending high priority, but these refill requests need to be reviewed by primary care provider.    - Dr. Chaney is out of office 12/10/24, but back in office 12/11/24

## 2024-12-11 RX ORDER — TRAMADOL HYDROCHLORIDE 50 MG/1
50 TABLET ORAL
Qty: 150 TABLET | Refills: 0 | Status: SHIPPED | OUTPATIENT
Start: 2024-12-11

## 2024-12-11 RX ORDER — DIAZEPAM 10 MG/1
10 TABLET ORAL 3 TIMES DAILY PRN
Qty: 30 TABLET | Refills: 0 | Status: SHIPPED | OUTPATIENT
Start: 2024-12-11

## 2024-12-15 NOTE — PROGRESS NOTES
Dear Min,    I reviewed the pathology report from the polyp(s) we completely removed during your recent colonoscopy. The polyp removed was a right sided hyperplastic polyp, which is a benign growth. However, these are the types of polyps that if not removed could become a colon cancer. All of your polyps were completely removed.     The current health care guidelines recommend that patients with the size, number, and types of polyps you have should repeat their colonoscopy in 5 years to look for any new polyps that might have grown.    If you have further questions please call me at [phone] or message me through [employer].    Sincerely,   Silvana Christianson MD

## 2024-12-17 ENCOUNTER — TELEPHONE (OUTPATIENT)
Facility: CLINIC | Age: 64
End: 2024-12-17

## 2024-12-17 NOTE — TELEPHONE ENCOUNTER
Recall colonoscopy in 5 years per Dr. Christianson. Colonoscopy done on 12/4/24.    Health maintenance updated and message sent to pt outreach to repeat colon in 5 years.    [employer] message sent by MD.

## 2024-12-17 NOTE — TELEPHONE ENCOUNTER
----- Message from Silvana Cuadra Ma sent at 12/15/2024  2:56 PM CST -----  Dear Min,    I reviewed the pathology report from the polyp(s) we completely removed during your recent colonoscopy. The polyp removed was a right sided hyperplastic polyp, which is a benign growth. However, these are the types of polyps that if not removed could become a colon cancer. All of your polyps were completely removed.     The current health care guidelines recommend that patients with the size, number, and types of polyps you have should repeat their colonoscopy in 5 years to look for any new polyps that might have grown.    If you have further questions please call me at [phone] or message me through [employer].    Sincerely,   Silvana Christianson MD

## 2025-01-06 DIAGNOSIS — Z98.1 HISTORY OF LUMBAR FUSION: ICD-10-CM

## 2025-01-06 DIAGNOSIS — M51.9 LUMBAR DISC DISEASE: ICD-10-CM

## 2025-01-06 DIAGNOSIS — F41.9 ANXIETY: ICD-10-CM

## 2025-01-09 RX ORDER — TRAMADOL HYDROCHLORIDE 50 MG/1
50 TABLET ORAL
Qty: 150 TABLET | Refills: 0 | Status: SHIPPED | OUTPATIENT
Start: 2025-01-09

## 2025-01-09 RX ORDER — DIAZEPAM 10 MG/1
10 TABLET ORAL 3 TIMES DAILY PRN
Qty: 30 TABLET | Refills: 0 | Status: SHIPPED | OUTPATIENT
Start: 2025-01-09

## 2025-01-09 NOTE — TELEPHONE ENCOUNTER
Please review; protocol failed/No Protocol    Tramadol and Diazepam   Recent Fills: 10/11/2024, 11/11/2024, 12/11/2024    Last Rx Written: 12/11/2024    Last Office Visit: 08/14/2024    Requested Prescriptions   Pending Prescriptions Disp Refills    traMADol 50 MG Oral Tab 150 tablet 0     Sig: Take 1 tablet (50 mg total) by mouth 5 (five) times daily.       Controlled Substance Medication Failed - 1/9/2025 12:23 PM        Failed - This medication is a controlled substance - forward to provider to refill        Passed - Medication is active on med list          diazePAM 10 MG Oral Tab 30 tablet 0     Sig: Take 1 tablet (10 mg total) by mouth 3 (three) times daily as needed.       Controlled Substance Medication Failed - 1/9/2025 12:23 PM        Failed - This medication is a controlled substance - forward to provider to refill        Passed - Medication is active on med list             Recent Outpatient Visits              4 months ago Colon cancer screening    [facility]    Nurse Only    4 months ago Routine physical examination    [facility] Min Chaney MD    Office Visit

## 2025-01-31 DIAGNOSIS — M51.9 LUMBAR DISC DISEASE: ICD-10-CM

## 2025-01-31 DIAGNOSIS — Z98.1 HISTORY OF LUMBAR FUSION: ICD-10-CM

## 2025-01-31 DIAGNOSIS — F41.9 ANXIETY: ICD-10-CM

## 2025-02-04 RX ORDER — TRAMADOL HYDROCHLORIDE 50 MG/1
50 TABLET ORAL
Qty: 150 TABLET | Refills: 0 | Status: SHIPPED | OUTPATIENT
Start: 2025-02-04

## 2025-02-04 RX ORDER — DIAZEPAM 10 MG/1
10 TABLET ORAL 3 TIMES DAILY PRN
Qty: 30 TABLET | Refills: 0 | Status: SHIPPED | OUTPATIENT
Start: 2025-02-04

## 2025-02-04 NOTE — TELEPHONE ENCOUNTER
Please review; protocol failed/ has no protocol      Fill dates for Diazepam    Recent fills: 01/09/2025 12/11/2024,11/11/2024,  Last Rx written: 01/09/2025  Last Office Visit: 08/14/2024    Recent Visits  Date Type Provider Dept   08/14/24 Office Visit Min Chaney MD [facility]     Fill dates for Tramadol    Recent fills: 01/10/2025,12/11/2024,11/11/2024  Last Rx written: 01/09/2025  Last Office Visit: 08/14/2024    Recent Visits  Date Type Provider Dept   08/14/24 Office Visit Min Chaney MD [facility]         Requested Prescriptions   Pending Prescriptions Disp Refills    traMADol 50 MG Oral Tab 150 tablet 0     Sig: Take 1 tablet (50 mg total) by mouth 5 (five) times daily.       Controlled Substance Medication Failed - 2/4/2025 11:46 AM        Failed - This medication is a controlled substance - forward to provider to refill        Passed - Medication is active on med list          diazePAM 10 MG Oral Tab 30 tablet 0     Sig: Take 1 tablet (10 mg total) by mouth 3 (three) times daily as needed.       Controlled Substance Medication Failed - 2/4/2025 11:46 AM        Failed - This medication is a controlled substance - forward to provider to refill        Passed - Medication is active on med list           Recent Outpatient Visits              5 months ago Colon cancer screening    [facility]    Nurse Only    5 months ago Routine physical examination    [facility] Min Chaney MD    Office Visit

## 2025-02-27 DIAGNOSIS — Z98.1 HISTORY OF LUMBAR FUSION: ICD-10-CM

## 2025-02-27 DIAGNOSIS — M51.9 LUMBAR DISC DISEASE: ICD-10-CM

## 2025-02-27 DIAGNOSIS — F41.9 ANXIETY: ICD-10-CM

## 2025-03-04 RX ORDER — TRAMADOL HYDROCHLORIDE 50 MG/1
50 TABLET ORAL
Qty: 150 TABLET | Refills: 0 | Status: SHIPPED | OUTPATIENT
Start: 2025-03-04

## 2025-03-04 RX ORDER — DIAZEPAM 10 MG/1
10 TABLET ORAL 3 TIMES DAILY PRN
Qty: 30 TABLET | Refills: 0 | Status: SHIPPED | OUTPATIENT
Start: 2025-03-04

## 2025-03-04 NOTE — TELEPHONE ENCOUNTER
Please review; protocol failed/No Protocol      Recent Fills:   Diazepam: 11/11/2024, 12/11/2024, 02/05/2025  Tramadol: 12/11/2024, 01/10/2025, 02/06/2025-due 03/11/2025    Last Rx Written:   Diazepam: 02/04/2025  Tramadol: 02/04/2025    Last Office Visit: 08/14/2024

## 2025-03-26 DIAGNOSIS — F41.9 ANXIETY: ICD-10-CM

## 2025-03-26 DIAGNOSIS — Z98.1 HISTORY OF LUMBAR FUSION: ICD-10-CM

## 2025-03-26 DIAGNOSIS — M51.9 LUMBAR DISC DISEASE: ICD-10-CM

## 2025-03-31 NOTE — TELEPHONE ENCOUNTER
Please kindly review this medication    **Patient is not due for a refill on their tramadol until 4/11/25 (this information is based on the last 6 dispenses recorded from the [facility]). Please advise**    Is patient's diazepam supposed to last patient 30 days? If patient is to fill every 30 days, patient is not due for a refill until 4/11/25      [x] FAILS PROTOCOL            [x] Controlled Substance             [] Medication not previously prescribed by Provider            [] Due for appointment- no future appointment scheduled            [] BP reading            [] Labs            [] Depression Screening            [] Asthma (ACT recorded/ACT score)    [] HAS NO PROTOCOL ATTACHED     TRAMADOL:   Recent fill dates: 12/11/24, 1/10/25, 2/6/25, and 3/4/25  Date of  last written prescription: 3/4/25   Last written quantity: #150 each and processed as a 30 day supply  Refill date: 04/11/2025  LAST OFFICE VISIT: 8/14/24  [x] Takes as needed  [] Takes scheduled daily    DIAZEPAM:  Recent fill dates: 12/11/24, 1/10/25, 2/4/25, and 3/4/25  Date of  last written prescription: 3/4/25   Last written quantity: #30 each and processed as a 10 day supply  Refill date: 4/1/2025  LAST OFFICE VISIT: 8/14/24  [x] Takes as needed  [] Takes scheduled daily

## 2025-04-03 RX ORDER — TRAMADOL HYDROCHLORIDE 50 MG/1
50 TABLET ORAL
Qty: 150 TABLET | Refills: 0 | Status: SHIPPED | OUTPATIENT
Start: 2025-04-03

## 2025-04-03 RX ORDER — DIAZEPAM 10 MG/1
10 TABLET ORAL 3 TIMES DAILY PRN
Qty: 30 TABLET | Refills: 0 | Status: SHIPPED | OUTPATIENT
Start: 2025-04-03

## 2025-04-03 NOTE — TELEPHONE ENCOUNTER
Patient is calling for status of his medication refill request.  Per the patient he will be out of medication by Sunday 4-6-25. Patient states that the pharmacy is closed on Sunday. Patient can be reached at [phone].

## 2025-04-07 ENCOUNTER — TELEPHONE (OUTPATIENT)
Facility: CLINIC | Age: 65
End: 2025-04-07

## 2025-04-07 ENCOUNTER — TELEPHONE (OUTPATIENT)
Dept: INTERNAL MEDICINE CLINIC | Facility: CLINIC | Age: 65
End: 2025-04-07

## 2025-04-07 ENCOUNTER — OFFICE VISIT (OUTPATIENT)
Dept: INTERNAL MEDICINE CLINIC | Facility: CLINIC | Age: 65
End: 2025-04-07

## 2025-04-07 VITALS
SYSTOLIC BLOOD PRESSURE: 112 MMHG | HEART RATE: 76 BPM | HEIGHT: 69 IN | BODY MASS INDEX: 31.84 KG/M2 | DIASTOLIC BLOOD PRESSURE: 78 MMHG | WEIGHT: 215 LBS | RESPIRATION RATE: 16 BRPM | TEMPERATURE: 98 F

## 2025-04-07 DIAGNOSIS — M79.604 PAIN OF RIGHT LOWER EXTREMITY: Primary | ICD-10-CM

## 2025-04-07 DIAGNOSIS — T14.90XA INJURY: ICD-10-CM

## 2025-04-07 DIAGNOSIS — M25.571 ACUTE RIGHT ANKLE PAIN: Primary | ICD-10-CM

## 2025-04-07 DIAGNOSIS — M25.571 ACUTE RIGHT ANKLE PAIN: ICD-10-CM
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
## 2025-04-07 DIAGNOSIS — M25.571 RIGHT ANKLE PAIN, UNSPECIFIED CHRONICITY: ICD-10-CM

## 2025-04-07 DIAGNOSIS — M51.9 LUMBAR DISC DISEASE: ICD-10-CM

## 2025-04-07 DIAGNOSIS — Z09 HOSPITAL DISCHARGE FOLLOW-UP: Primary | ICD-10-CM

## 2025-04-07 NOTE — PROGRESS NOTES
iMn Leonard is a 64 year old male.  Chief Complaint   Patient presents with    ER F/U     Ankle sprain Workmens Comp case     HPI:   He presents for ER follow up. He went to the ER after having a fall at work.  He had right ankle pain after the fall. He was taken to [facility] emergency for evaluation. He had an right ankle x-ray completed which showed no fracture or dislocation.     He walked on to a wet floor while at work and he slipped. He heard his right ankle \"snap\" and had immediate pain. The fall happened on 3/20.     He installs toll transponders in cars. He walks about 6k-16K steps a day. He is also in and out of vehicles throughout the day. Currently he is not able to perform his job duties.     He is currently wearing a walking boot. He has been icing the right ankle. He continues to have pain. He is taking tramadol as needed. He takes this currently for back pain as well. He is also taking ibuprofen as needed.     He is having right ankle pain with flexion and extension. He rates the pain an 8/10. There is swelling present.     He is requesting a referral for ortho and a letter for work.     Current Outpatient Medications   Medication Sig Dispense Refill    traMADol 50 MG Oral Tab Take 1 tablet (50 mg total) by mouth 5 (five) times daily. 150 tablet 0    diazePAM 10 MG Oral Tab Take 1 tablet (10 mg total) by mouth 3 (three) times daily as needed. 30 tablet 0    Multiple Vitamin (ONE-DAILY MULTI VITAMINS) Oral Tab Take 1 tablet by mouth daily.        Past Medical History:    Sleep apnea    Visual impairment      Past Surgical History:   Procedure Laterality Date    Colonoscopy  10/22/2014    Edilberto Mabry MD    Colonoscopy N/A 12/04/2024    Silvana Christianson MD    Colonoscopy N/A 12/4/2024    Procedure: COLONOSCOPY;  Surgeon: Silvana Christianson MD;  Location: [facility] ENDOSCOPY      Social History:  Social History     Socioeconomic History    Marital status:    Tobacco Use    Smoking status:  Former     Types: Cigarettes    Smokeless tobacco: Never    Tobacco comments:     Quit 2003   Substance and Sexual Activity    Alcohol use: Not Currently    Drug use: Never    Sexual activity: Yes     Partners: Female     Social Drivers of Health     Food Insecurity: No Food Insecurity (4/7/2025)    NCSS - Food Insecurity     Worried About Running Out of Food in the Last Year: No     Ran Out of Food in the Last Year: No   Transportation Needs: No Transportation Needs (4/7/2025)    NCSS - Transportation     Lack of Transportation: No   Housing Stability: Not At Risk (4/7/2025)    NCSS - Housing/Utilities     Has Housing: Yes     Worried About Losing Housing: No     Unable to Get Utilities: No      Family History   Problem Relation Age of Onset    Colon Cancer Neg     Colon Polyps Neg       Allergies[1]     REVIEW OF SYSTEMS:     Review of Systems   Constitutional:  Negative for fever.   HENT: Negative.     Respiratory:  Negative for cough, shortness of breath and wheezing.    Cardiovascular:  Negative for chest pain.   Gastrointestinal: Negative.    Genitourinary: Negative.    Musculoskeletal:  Positive for arthralgias (right ankle) and gait problem.   Skin: Negative.    Psychiatric/Behavioral: Negative.        Wt Readings from Last 5 Encounters:   04/07/25 215 lb (97.5 kg)   11/25/24 225 lb (102.1 kg)   08/14/24 227 lb (103 kg)     Body mass index is 31.75 kg/m².      EXAM:   /78 (BP Location: Right arm, Patient Position: Sitting, Cuff Size: large)   Pulse 76   Temp 98.1 °F (36.7 °C) (Temporal)   Resp 16   Ht 5' 9\" (1.753 m)   Wt 215 lb (97.5 kg)   BMI 31.75 kg/m²     Physical Exam  Vitals reviewed.   Constitutional:       Appearance: Normal appearance.   HENT:      Head: Normocephalic.   Cardiovascular:      Rate and Rhythm: Normal rate and regular rhythm.      Pulses: Normal pulses.   Pulmonary:      Breath sounds: Normal breath sounds. No wheezing.   Musculoskeletal:         General: No swelling.       Right ankle: Swelling present. Tenderness present. Decreased range of motion.   Skin:     General: Skin is warm and dry.   Neurological:      Mental Status: He is alert and oriented to person, place, and time.   Psychiatric:         Mood and Affect: Mood normal.         Behavior: Behavior normal.            ASSESSMENT AND PLAN:   1. Hospital discharge follow-up  - hospital notes and imaging reviewed     2. Acute right ankle pain  - continue to wear walking boot with weight bearing   - continue ibuprofen as needed for mild pain  - continue tramadol for severe pain   - Ortho Referral - In Network  - letter given per patient request   - patient to remain off from work until evaluated by ortho     3. Fall, sequela  - mechanical fall at work  - patient slipped on a wet floor   - cause of #2      The patient indicates understanding of these issues and agrees to the plan.  Return for if symptoms do not resolve.       [1] No Known Allergies

## 2025-04-07 NOTE — TELEPHONE ENCOUNTER
Patient calling to schedule for right knee to ankle injury. Patient states it started as a bad sprain, workmans comp injury from 3/20 and is not getting better.   Please advise who would be best to see pt for this concern.   Patient states the pain is in the calf, below the knee, all the way to the ankle.

## 2025-04-07 NOTE — TELEPHONE ENCOUNTER
Patient called stating he was seen by Didi Hoskins today and given a referral for Edouard Shoemaker and he was informed that he is a specialist for knees and he doesn't see patients for ankle injures, patient is requesting a new referral.

## 2025-04-07 NOTE — TELEPHONE ENCOUNTER
Spoke with patient. WC injury. Scheduled him for 4/10/25 at 11:15 am. Location, directions and MD name given. He will get referral if needed. X-rays ordered as he does not have disc and feels that the ones taken may have missed something as he still has pain that he feels above ankle. Advised to come in early and get imaging done prior. He is amenable to plan

## 2025-04-10 ENCOUNTER — HOSPITAL ENCOUNTER (OUTPATIENT)
Dept: GENERAL RADIOLOGY | Age: 65
Discharge: HOME | End: 2025-04-10
Attending: STUDENT IN AN ORGANIZED HEALTH CARE EDUCATION/TRAINING PROGRAM
Payer: OTHER MISCELLANEOUS

## 2025-04-10 ENCOUNTER — OFFICE VISIT (OUTPATIENT)
Dept: ORTHOPEDICS CLINIC | Facility: CLINIC | Age: 65
End: 2025-04-10
Payer: OTHER MISCELLANEOUS

## 2025-04-10 DIAGNOSIS — M25.561 ACUTE PAIN OF RIGHT KNEE: ICD-10-CM

## 2025-04-10 DIAGNOSIS — T14.90XA INJURY: ICD-10-CM

## 2025-04-10 DIAGNOSIS — M79.604 PAIN OF RIGHT LOWER EXTREMITY: ICD-10-CM

## 2025-04-10 DIAGNOSIS — M25.571 RIGHT ANKLE PAIN, UNSPECIFIED CHRONICITY: ICD-10-CM

## 2025-04-10 DIAGNOSIS — M25.571 RIGHT ANKLE PAIN, UNSPECIFIED CHRONICITY: Primary | ICD-10-CM

## 2025-04-10 PROCEDURE — 73562 X-RAY EXAM OF KNEE 3: CPT | Performed by: STUDENT IN AN ORGANIZED HEALTH CARE EDUCATION/TRAINING PROGRAM

## 2025-04-10 PROCEDURE — 73610 X-RAY EXAM OF ANKLE: CPT | Performed by: STUDENT IN AN ORGANIZED HEALTH CARE EDUCATION/TRAINING PROGRAM

## 2025-04-10 PROCEDURE — 73590 X-RAY EXAM OF LOWER LEG: CPT | Performed by: STUDENT IN AN ORGANIZED HEALTH CARE EDUCATION/TRAINING PROGRAM

## 2025-04-10 PROCEDURE — 73600 X-RAY EXAM OF ANKLE: CPT | Performed by: STUDENT IN AN ORGANIZED HEALTH CARE EDUCATION/TRAINING PROGRAM

## 2025-04-14 ENCOUNTER — TELEPHONE (OUTPATIENT)
Facility: CLINIC | Age: 65
End: 2025-04-14

## 2025-04-14 NOTE — PROGRESS NOTES
[facility]  [address] [facility] [address]  [address]  [phone]     NURSING INTAKE COMMENTS:   Chief Complaint   Patient presents with    Ankle Injury     Worker comp consult for right ankle and leg pain 5-10/10 worse on certain ROM due to injury on 3/20/2025. He felt down at work ( [employer])            The following individual(s) verbally consented to be recorded using ambient AI listening technology and understand that they can each withdraw their consent to this listening technology at any point by asking the clinician to turn off or pause the recording:    Patient name: Min Leonard      HPI:   History of Present Illness  Mr. Min Leonard is a 64 year old male who presents with right leg pain following a fall. He was referred by Dr. Almanzar for further evaluation.    On March 20, he experienced a fall, landing on his left side, and heard a 'snap' sound. He had immediate severe pain, rated as 10 out of 10, and tingling sensations akin to an electric shock in his right leg. The pain was diffuse, affecting the entire leg, with significant discomfort around the knee area. He was transported by ambulance to the hospital where x-rays were taken, which were reported as negative. He was placed in a boot and advised to follow up in seven days.    Due to delays from his workplace in arranging a doctor's visit, he sought medical attention on his own after 10 to 14 days. He was initially referred to Dr. Almanzar and subsequently to the current provider.    Since the injury, he has experienced persistent tenderness and pain from the knee to the ankle, particularly when standing in the shower, which causes cramping and pain in the calf. He has been icing the area three times a day and attempted stretching exercises, which seemed to exacerbate the pain, leading him to discontinue them. He reports difficulty lying on his left side in bed due to pain centered around the ankle and knee. He  describes significant pain when flexing his foot, rating it as a 10 out of 10. Swelling extends up to the knee and bruising was still present a week and a half ago.    He is concerned about the lack of comprehensive evaluation of his knee and leg, noting that he has not had any imaging beyond the initial x-rays at the hospital.        Past Medical History[1]  Past Surgical History[2]  Current Medications[3]  Allergies[4]  Family History[5]    Social History     Occupational History    Not on file   Tobacco Use    Smoking status: Former     Types: Cigarettes    Smokeless tobacco: Never    Tobacco comments:     Quit 2003   Vaping Use    Vaping status: Not on file   Substance and Sexual Activity    Alcohol use: Not Currently    Drug use: Never    Sexual activity: Yes     Partners: Female        Review of Systems:  GENERAL: denies fevers, chills, night sweats, fatigue, unintentional weight loss/gain  SKIN: denies skin lesions, open sores, rash  HEENT:denies recent vision change, new nasal congestion,hearing loss, tinnitus, sore throat, headaches  RESPIRATORY: denies new shortness of breath, cough, asthma, wheezing  CARDIOVASCULAR: denies chest pain, leg cramps with exertion, palpitations, leg swelling  GI: denies abdominal pain, nausea, vomiting, diarrhea, constipation, hematochezia, worsening heartburn or stomach ulcers  : denies dysuria, hematuria, incontinence, increased frequency, urgency, difficulty urinating  MUSCULOSKELETAL: denies musculoskeletal complaints other than in HPI  NEURO: denies numbness, tingling, weakness, balance issues, dizziness, memory loss  PSYCHIATRIC: denies Hx of depression, anxiety, other psychiatric disorders  HEMATOLOGIC: denies blood clots, anemia, blood clotting disorders, blood transfusion  ENDOCRINE: denies autoimmune disease, thyroid issues, or diabetes  ALLERGY: denies asthma, seasonal allergies    Physical Examination:    There were no vitals taken for this visit.    Physical  Exam  MUSCULOSKELETAL: Tenderness in the right leg on manipulation. Tenderness in the left leg on palpation. Bruising on the right leg extending up to the knee.    Constitutional: appears well hydrated, alert and responsive, no acute distress noted          Imaging:     Results  RADIOLOGY  Right ankle x-ray: Negative (03/20/2025)  Right fibula x-ray: Fracture (04/10/2025)      Imaging was independently reviewed and interpreted by attending physician  No results found.     Labs:  Lab Results   Component Value Date    WBC 7.1 08/24/2024    HGB 14.5 08/24/2024     08/24/2024      Lab Results   Component Value Date     (H) 08/24/2024    BUN 16 08/24/2024    CREATSERUM 0.84 08/24/2024        Assessment and Plan:  Assessment & Plan  Maisonneuve fracture  Fibular fracture with potential ankle instability. Current x-rays confirm fibular fracture. Ankle instability may require surgery.  - Order stress x-ray to assess ankle instability.  - Consider MRI if stress x-ray indicates instability.  - Recommend surgery if stress x-ray shows gapping.  - If stable, manage with boot and non-weight bearing.      Diagnoses and all orders for this visit:    Right ankle pain, unspecified chronicity  -     Cancel: XR ANKLE (MIN 3 VIEWS), RIGHT (CPT=73610); Future  -     DME - External    Acute pain of right knee  -     XR KNEE (3 VIEWS), RIGHT (CPT=73562); Future        We reviewed the treatment of this disease condition, both surgical and nonsurgical. I have recommended that we proceed with surgical treatment as we agree surgical intervention would likely offer the best opportunity for symptomatic relief and functional recovery. I used diagrams, radiographic studies, and a 3-dimensional model to outline the patient's pathology, as well as general surgical principles.  In light of this, we recommend proceeding with a right ankle open syndesmotic fixation.     We discussed the risk and benefits of the procedure, including, but  not limited to:  infection, blood loss, potential transient or permanent injury to nerves or blood vessels, joint stiffness, persistent pain, need for future operation, failure of healing, wound complications, failure of therapeutic intervention, risk of re-injury, fracture, fixation failure, deep vein thrombosis and pulmonary embolism. We discussed the proposed rehabilitation timeline as well as expected postoperative restrictions.    The patient voiced a good understanding of treatment options, risks and benefits, postoperative instructions, rehabilitation timeline, and restrictions. The patient was given the opportunity to ask questions, which were all answered to the best of my ability and to the patient's satisfaction.     Rationale for 57 Modifier Addendum    Decision for Major Surgery  The decision for a major surgery was made during this visit/consultation based on review and discussion of the history of presentation, examination, available labs/imaging, and the patient's functional status. The medical and surgical history were considered with regards to risk and potential modifications needed.      Follow Up: No follow-ups on file.          Ronan Kendrick MD Orthopedic Surgery / Sports Medicine Specialist  [facility] [place] [address]  [email or web address]    t: [phone] f: [phone]    This note was dictated using Dragon software.  While it was briefly proofread prior to completion, some grammatical, spelling, and word choice errors due to dictation may still occur.       [1]   Past Medical History:   Sleep apnea    Visual impairment   [2]   Past Surgical History:  Procedure Laterality Date    Colonoscopy  10/22/2014    Edilberto Mabry MD    Colonoscopy N/A 12/04/2024    Silvana Christianson MD    Colonoscopy N/A 12/4/2024    Procedure: COLONOSCOPY;  Surgeon: Silvana Christianson MD;  Location: [facility] ENDOSCOPY   [3]   Current Outpatient Medications   Medication Sig Dispense  Refill    traMADol 50 MG Oral Tab Take 1 tablet (50 mg total) by mouth 5 (five) times daily. 150 tablet 0    diazePAM 10 MG Oral Tab Take 1 tablet (10 mg total) by mouth 3 (three) times daily as needed. 30 tablet 0    Multiple Vitamin (ONE-DAILY MULTI VITAMINS) Oral Tab Take 1 tablet by mouth daily.     [4] No Known Allergies  [5]   Family History  Problem Relation Age of Onset    Colon Cancer Neg     Colon Polyps Neg

## 2025-04-14 NOTE — H&P (VIEW-ONLY)
[facility]  [address] [facility] [address]  [address]  [phone]     NURSING INTAKE COMMENTS:   Chief Complaint   Patient presents with    Ankle Injury     Worker comp consult for right ankle and leg pain 5-10/10 worse on certain ROM due to injury on 3/20/2025. He felt down at work ( [employer])            The following individual(s) verbally consented to be recorded using ambient AI listening technology and understand that they can each withdraw their consent to this listening technology at any point by asking the clinician to turn off or pause the recording:    Patient name: Min Leonard      HPI:   History of Present Illness  Mr. Min Leonard is a 64 year old male who presents with right leg pain following a fall. He was referred by Dr. Almanzar for further evaluation.    On March 20, he experienced a fall, landing on his left side, and heard a 'snap' sound. He had immediate severe pain, rated as 10 out of 10, and tingling sensations akin to an electric shock in his right leg. The pain was diffuse, affecting the entire leg, with significant discomfort around the knee area. He was transported by ambulance to the hospital where x-rays were taken, which were reported as negative. He was placed in a boot and advised to follow up in seven days.    Due to delays from his workplace in arranging a doctor's visit, he sought medical attention on his own after 10 to 14 days. He was initially referred to Dr. Almanzar and subsequently to the current provider.    Since the injury, he has experienced persistent tenderness and pain from the knee to the ankle, particularly when standing in the shower, which causes cramping and pain in the calf. He has been icing the area three times a day and attempted stretching exercises, which seemed to exacerbate the pain, leading him to discontinue them. He reports difficulty lying on his left side in bed due to pain centered around the ankle and knee. He  describes significant pain when flexing his foot, rating it as a 10 out of 10. Swelling extends up to the knee and bruising was still present a week and a half ago.    He is concerned about the lack of comprehensive evaluation of his knee and leg, noting that he has not had any imaging beyond the initial x-rays at the hospital.        Past Medical History[1]  Past Surgical History[2]  Current Medications[3]  Allergies[4]  Family History[5]    Social History     Occupational History    Not on file   Tobacco Use    Smoking status: Former     Types: Cigarettes    Smokeless tobacco: Never    Tobacco comments:     Quit 2003   Vaping Use    Vaping status: Not on file   Substance and Sexual Activity    Alcohol use: Not Currently    Drug use: Never    Sexual activity: Yes     Partners: Female        Review of Systems:  GENERAL: denies fevers, chills, night sweats, fatigue, unintentional weight loss/gain  SKIN: denies skin lesions, open sores, rash  HEENT:denies recent vision change, new nasal congestion,hearing loss, tinnitus, sore throat, headaches  RESPIRATORY: denies new shortness of breath, cough, asthma, wheezing  CARDIOVASCULAR: denies chest pain, leg cramps with exertion, palpitations, leg swelling  GI: denies abdominal pain, nausea, vomiting, diarrhea, constipation, hematochezia, worsening heartburn or stomach ulcers  : denies dysuria, hematuria, incontinence, increased frequency, urgency, difficulty urinating  MUSCULOSKELETAL: denies musculoskeletal complaints other than in HPI  NEURO: denies numbness, tingling, weakness, balance issues, dizziness, memory loss  PSYCHIATRIC: denies Hx of depression, anxiety, other psychiatric disorders  HEMATOLOGIC: denies blood clots, anemia, blood clotting disorders, blood transfusion  ENDOCRINE: denies autoimmune disease, thyroid issues, or diabetes  ALLERGY: denies asthma, seasonal allergies    Physical Examination:    There were no vitals taken for this visit.    Physical  Exam  MUSCULOSKELETAL: Tenderness in the right leg on manipulation. Tenderness in the left leg on palpation. Bruising on the right leg extending up to the knee.    Constitutional: appears well hydrated, alert and responsive, no acute distress noted          Imaging:     Results  RADIOLOGY  Right ankle x-ray: Negative (03/20/2025)  Right fibula x-ray: Fracture (04/10/2025)      Imaging was independently reviewed and interpreted by attending physician  No results found.     Labs:  Lab Results   Component Value Date    WBC 7.1 08/24/2024    HGB 14.5 08/24/2024     08/24/2024      Lab Results   Component Value Date     (H) 08/24/2024    BUN 16 08/24/2024    CREATSERUM 0.84 08/24/2024        Assessment and Plan:  Assessment & Plan  Maisonneuve fracture  Fibular fracture with potential ankle instability. Current x-rays confirm fibular fracture. Ankle instability may require surgery.  - Order stress x-ray to assess ankle instability.  - Consider MRI if stress x-ray indicates instability.  - Recommend surgery if stress x-ray shows gapping.  - If stable, manage with boot and non-weight bearing.      Diagnoses and all orders for this visit:    Right ankle pain, unspecified chronicity  -     Cancel: XR ANKLE (MIN 3 VIEWS), RIGHT (CPT=73610); Future  -     DME - External    Acute pain of right knee  -     XR KNEE (3 VIEWS), RIGHT (CPT=73562); Future        We reviewed the treatment of this disease condition, both surgical and nonsurgical. I have recommended that we proceed with surgical treatment as we agree surgical intervention would likely offer the best opportunity for symptomatic relief and functional recovery. I used diagrams, radiographic studies, and a 3-dimensional model to outline the patient's pathology, as well as general surgical principles.  In light of this, we recommend proceeding with a right ankle open syndesmotic fixation.     We discussed the risk and benefits of the procedure, including, but  not limited to:  infection, blood loss, potential transient or permanent injury to nerves or blood vessels, joint stiffness, persistent pain, need for future operation, failure of healing, wound complications, failure of therapeutic intervention, risk of re-injury, fracture, fixation failure, deep vein thrombosis and pulmonary embolism. We discussed the proposed rehabilitation timeline as well as expected postoperative restrictions.    The patient voiced a good understanding of treatment options, risks and benefits, postoperative instructions, rehabilitation timeline, and restrictions. The patient was given the opportunity to ask questions, which were all answered to the best of my ability and to the patient's satisfaction.     Rationale for 57 Modifier Addendum    Decision for Major Surgery  The decision for a major surgery was made during this visit/consultation based on review and discussion of the history of presentation, examination, available labs/imaging, and the patient's functional status. The medical and surgical history were considered with regards to risk and potential modifications needed.      Follow Up: No follow-ups on file.          Ronan Kendrick MD Orthopedic Surgery / Sports Medicine Specialist  [facility]  [facility] [address]  [email or web address]    t: [phone] f: [phone]    This note was dictated using Dragon software.  While it was briefly proofread prior to completion, some grammatical, spelling, and word choice errors due to dictation may still occur.       [1]   Past Medical History:   Sleep apnea    Visual impairment   [2]   Past Surgical History:  Procedure Laterality Date    Colonoscopy  10/22/2014    Edilberto Mabry MD    Colonoscopy N/A 12/04/2024    Silvana Christianson MD    Colonoscopy N/A 12/4/2024    Procedure: COLONOSCOPY;  Surgeon: Silvana Christianson MD;  Location: [facility] ENDOSCOPY   [3]   Current Outpatient Medications   Medication Sig Dispense  Refill    traMADol 50 MG Oral Tab Take 1 tablet (50 mg total) by mouth 5 (five) times daily. 150 tablet 0    diazePAM 10 MG Oral Tab Take 1 tablet (10 mg total) by mouth 3 (three) times daily as needed. 30 tablet 0    Multiple Vitamin (ONE-DAILY MULTI VITAMINS) Oral Tab Take 1 tablet by mouth daily.     [4] No Known Allergies  [5]   Family History  Problem Relation Age of Onset    Colon Cancer Neg     Colon Polyps Neg

## 2025-04-14 NOTE — TELEPHONE ENCOUNTER
Called patient and explained that Dr Kendrick is referring out to Dr Matthews to do surgery asap which is what is in the best interest of patient due to Dr Kenrdick not having surgery time soon enough. Patient states he has video visit with Dr Matthews on 4/16 to discuss surgery and will keep that appointment. He does have phone number to orthopedic specialists office and has checked with them for any sooner openings. Patient was agreeable and understanding of plan and had no further concerns.

## 2025-04-14 NOTE — TELEPHONE ENCOUNTER
Patient called very concerned that there are no notes in his mychart but that he was told to contact another doctor for surgery on his ankle. Patient states Dr. Kendrick called him Saturday morning and woke him up so he was not prepared with questions.  Patient states he is disappointed because he really trusts Dr. Kendrick and doesn't understand why he is being passed off. Please call pt to advise.

## 2025-04-15 ENCOUNTER — TELEPHONE (OUTPATIENT)
Dept: ORTHOPEDICS CLINIC | Facility: CLINIC | Age: 65
End: 2025-04-15

## 2025-04-15 NOTE — TELEPHONE ENCOUNTER
W/C - Patient called stating Dr. Kendrick referred him to Dr. Matthews for surgery since he wasn't able to add this patient on to his own OR schedule before next week.     Patient states he needs a referral to Dr. Matthews sent to his W/C . Patient is aware that Dr. Matthews's office will need to process the prior auth request with W/C since Dr. Mtathews is no longer contracted with [facility].

## 2025-04-18 NOTE — DISCHARGE INSTRUCTIONS
HOME INSTRUCTIONS  [facility] HOME CARE INSTRUCTIONS: POST-OP ANESTHESIA  The medication that you received for sedation or general anesthesia can last up to 24 hours. Your judgment and reflexes may be altered, even if you feel like your normal self.      We Recommend:   Do not drive any motor vehicle or bicycle   Avoid mowing the lawn, playing sports, or working with power tools/applicances (power saws, electric knives or mixers)   That you have someone stay with you on your first night home   Do not drink alcohol or take sleeping pills or tranquilizers   Do not sign legal documents within 24 hours of your procedure   If you had a nerve block for your surgery, take extra care not to put any pressure on your arm or hand for 24 hours    It is normal:  For you to have a sore throat if you had a breathing tube during surgery (while you were asleep!). The sore throat should get better within 48 hours. You can gargle with warm salt water (1/2 tsp in 4 oz warm water) or use a throat lozenge for comfort  To feel muscle aches or soreness especially in the abdomen, chest or neck. The achy feeling should go away in the next 24 hours  To feel weak, sleepy or \"wiped out\". Your should start feeling better in the next 24 hours.   To experience mild discomforts such as sore lip or tongue, headache, cramps, gas pains or a bloated feeling in your abdomen.   To experience mild back pain or soreness for a day or two if you had spinal or epidural anesthesia.   If you had laparoscopic surgery, to feel shoulder pain or discomfort on the day of surgery.   For some patients to have nausea after surgery/anesthesia    If you feel nausea or experience vomiting:   Try to move around less.   Eat less than usual or drink only liquids until the next morning   Nausea should resolve in about 24 hours    If you have a problem when you are at home:    Call your surgeons office   Discharge Instructions: After Your Surgery  You’ve just had surgery. During  surgery, you were given medicine called anesthesia to keep you relaxed and free of pain. After surgery, you may have some pain or nausea. This is common. Here are some tips for feeling better and getting well after surgery.   Going home  Your healthcare provider will show you how to take care of yourself when you go home. They'll also answer your questions. Have an adult family member or friend drive you home. For the first 24 hours after your surgery:   Don't drive or use heavy equipment.  Don't make important decisions or sign legal papers.  Take medicines as directed.  Don't drink alcohol.  Have someone stay with you, if needed. They can watch for problems and help keep you safe.  Be sure to go to all follow-up visits with your healthcare provider. And rest after your surgery for as long as your provider tells you to.   Coping with pain  If you have pain after surgery, pain medicine will help you feel better. Take it as directed, before pain becomes severe. Also, ask your healthcare provider or pharmacist about other ways to control pain. This might be with heat, ice, or relaxation. And follow any other instructions your surgeon or nurse gives you.      Stay on schedule with your medicine.     Tips for taking pain medicine  To get the best relief possible, remember these points:   Pain medicines can upset your stomach. Taking them with a little food may help.  Most pain relievers taken by mouth need at least 20 to 30 minutes to start to work.  Don't wait till your pain becomes severe before you take your medicine. Try to time your medicine so that you can take it before starting an activity. This might be before you get dressed, go for a walk, or sit down for dinner.  Constipation is a common side effect of some pain medicines. Call your healthcare provider before taking any medicines, such as laxatives or stool softeners, to help ease constipation. Also ask if you should skip any foods. Drinking lots of fluids  and eating foods, such as fruits and vegetables, that are high in fiber can also help. Remember, don't take laxatives unless your surgeon has prescribed them.  Drinking alcohol and taking pain medicine can cause dizziness and slow your breathing. It can even be deadly. Don't drink alcohol while taking pain medicine.  Pain medicine can make you react more slowly to things. Don't drive or run machinery while taking pain medicine.  Your healthcare provider may tell you to take acetaminophen to help ease your pain. Ask them how much you're supposed to take each day. Acetaminophen or other pain relievers may interact with your prescription medicines or other over-the-counter (OTC) medicines. Some prescription medicines have acetaminophen and other ingredients in them. Using both prescription and OTC acetaminophen for pain can cause you to accidentally overdose. Read the labels on your OTC medicines with care. This will help you to clearly know the list of ingredients, how much to take, and any warnings. It may also help you not take too much acetaminophen. If you have questions or don't understand the information, ask your pharmacist or healthcare provider to explain it to you before you take the OTC medicine.   Managing nausea  Some people have an upset stomach (nausea) after surgery. This is often because of anesthesia, pain, or pain medicine, less movement of food in the stomach, or the stress of surgery. These tips will help you handle nausea and eat healthy foods as you get better. If you were on a special food plan before surgery, ask your healthcare provider if you should follow it while you get better. Check with your provider on how your eating should progress. It may depend on the surgery you had. These general tips may help:   Don't push yourself to eat. Your body will tell you when to eat and how much.  Start off with clear liquids and soup. They're easier to digest.  Next try semi-solid foods as you feel  ready. These include mashed potatoes, applesauce, and gelatin.  Slowly move to solid foods. Don’t eat fatty, rich, or spicy foods at first.  Don't force yourself to have 3 large meals a day. Instead eat smaller amounts more often.  Take pain medicines with a small amount of solid food, such as crackers or toast. This helps prevent nausea.  When to call your healthcare provider  Call your healthcare provider right away if you have any of these:   You still have too much pain, or the pain gets worse, after taking the medicine. The medicine may not be strong enough. Or there may be a complication from the surgery.  You feel too sleepy, dizzy, or groggy. The medicine may be too strong.  Side effects, such as nausea or vomiting. Your healthcare provider may advise taking other medicines to treat these or may change your treatment plan..  Skin changes, such as rash, itching, or hives. This may mean you have an allergic reaction. Your provider may advise taking other medicines.  The incision looks different (for instance, part of it opens up).  Bleeding or fluid leaking from the incision site, and you weren't told to expect that.  Fever of 100.4°F (38°C) or higher, or as directed by your healthcare provider.  Call 911  Call 911 right away if you have:   Trouble breathing  Facial swelling    If you have obstructive sleep apnea   You were given anesthesia medicine during surgery to keep you comfortable and free of pain. After surgery, you may have more apnea spells because of this medicine and other medicines you were given. The spells may last longer than normal.    At home:  Keep using the continuous positive airway pressure (CPAP) device when you sleep. Unless your healthcare provider tells you not to, use it when you sleep, day or night. CPAP is a common device used to treat obstructive sleep apnea.  Talk with your provider before taking any pain medicine, muscle relaxants, or sedatives. Your provider will tell you about  the possible dangers of taking these medicines.  Contact your provider if your sleeping changes a lot even when taking medicines as directed.  [place] last reviewed this educational content on 4/1/2024  This information is for informational purposes only. This is not intended to be a substitute for professional medical advice, diagnosis, or treatment. Always seek the advice and follow the directions from your physician or other qualified health care provider.  © 2075-0729 [employer]. All rights reserved. This information is not intended as a substitute for professional medical care. Always follow your healthcare professional's instructions.

## 2025-04-21 ENCOUNTER — APPOINTMENT (OUTPATIENT)
Dept: GENERAL RADIOLOGY | Facility: HOSPITAL | Age: 65
End: 2025-04-21
Attending: STUDENT IN AN ORGANIZED HEALTH CARE EDUCATION/TRAINING PROGRAM
Payer: OTHER MISCELLANEOUS

## 2025-04-21 ENCOUNTER — ANESTHESIA EVENT (OUTPATIENT)
Dept: SURGERY | Facility: HOSPITAL | Age: 65
End: 2025-04-21
Payer: OTHER MISCELLANEOUS

## 2025-04-21 ENCOUNTER — ANESTHESIA (OUTPATIENT)
Dept: SURGERY | Facility: HOSPITAL | Age: 65
End: 2025-04-21
Payer: OTHER MISCELLANEOUS

## 2025-04-21 ENCOUNTER — HOSPITAL ENCOUNTER (OUTPATIENT)
Facility: HOSPITAL | Age: 65
Discharge: HOME | End: 2025-04-21
Attending: STUDENT IN AN ORGANIZED HEALTH CARE EDUCATION/TRAINING PROGRAM | Admitting: STUDENT IN AN ORGANIZED HEALTH CARE EDUCATION/TRAINING PROGRAM
Payer: OTHER MISCELLANEOUS

## 2025-04-21 VITALS
HEART RATE: 71 BPM | SYSTOLIC BLOOD PRESSURE: 142 MMHG | HEIGHT: 69 IN | BODY MASS INDEX: 34.07 KG/M2 | RESPIRATION RATE: 18 BRPM | OXYGEN SATURATION: 93 % | DIASTOLIC BLOOD PRESSURE: 94 MMHG | TEMPERATURE: 98 F | WEIGHT: 230 LBS

## 2025-04-21 DIAGNOSIS — S82.862A CLOSED DISPLACED MAISONNEUVE FRACTURE OF LEFT LOWER EXTREMITY, INITIAL ENCOUNTER: Primary | ICD-10-CM

## 2025-04-21 PROCEDURE — 76000 FLUOROSCOPY <1 HR PHYS/QHP: CPT | Performed by: STUDENT IN AN ORGANIZED HEALTH CARE EDUCATION/TRAINING PROGRAM

## 2025-04-21 PROCEDURE — 0SSF04Z REPOSITION RIGHT ANKLE JOINT WITH INTERNAL FIXATION DEVICE, OPEN APPROACH: ICD-10-PCS | Performed by: STUDENT IN AN ORGANIZED HEALTH CARE EDUCATION/TRAINING PROGRAM

## 2025-04-21 DEVICE — TIGHTROPE XP BUTTRESS PLATE IMPLANT SYS
Type: IMPLANTABLE DEVICE | Site: ANKLE | Status: FUNCTIONAL
Brand: ARTHREX®

## 2025-04-21 RX ORDER — FAMOTIDINE 20 MG/1
20 TABLET, FILM COATED ORAL ONCE
Status: COMPLETED | OUTPATIENT
Start: 2025-04-21 | End: 2025-04-21

## 2025-04-21 RX ORDER — ONDANSETRON 4 MG/1
4 TABLET, ORALLY DISINTEGRATING ORAL EVERY 8 HOURS PRN
Qty: 10 TABLET | Refills: 0 | Status: SHIPPED | OUTPATIENT
Start: 2025-04-21

## 2025-04-21 RX ORDER — SENNOSIDES 8.6 MG
325 CAPSULE ORAL DAILY
Qty: 30 TABLET | Refills: 0 | Status: SHIPPED | OUTPATIENT
Start: 2025-04-21 | End: 2025-05-21

## 2025-04-21 RX ORDER — METOCLOPRAMIDE 10 MG/1
10 TABLET ORAL ONCE
Status: COMPLETED | OUTPATIENT
Start: 2025-04-21 | End: 2025-04-21

## 2025-04-21 RX ORDER — CELECOXIB 100 MG/1
100 CAPSULE ORAL 2 TIMES DAILY
Qty: 60 CAPSULE | Refills: 0 | Status: SHIPPED | OUTPATIENT
Start: 2025-04-21 | End: 2025-05-21

## 2025-04-21 RX ORDER — ONDANSETRON 2 MG/ML
4 INJECTION INTRAMUSCULAR; INTRAVENOUS EVERY 6 HOURS PRN
Status: DISCONTINUED | OUTPATIENT
Start: 2025-04-21 | End: 2025-04-22

## 2025-04-21 RX ORDER — PROCHLORPERAZINE EDISYLATE 5 MG/ML
5 INJECTION INTRAMUSCULAR; INTRAVENOUS EVERY 8 HOURS PRN
Status: DISCONTINUED | OUTPATIENT
Start: 2025-04-21 | End: 2025-04-21 | Stop reason: HOSPADM

## 2025-04-21 RX ORDER — HYDROMORPHONE HYDROCHLORIDE 1 MG/ML
0.2 INJECTION, SOLUTION INTRAMUSCULAR; INTRAVENOUS; SUBCUTANEOUS EVERY 5 MIN PRN
Status: DISCONTINUED | OUTPATIENT
Start: 2025-04-21 | End: 2025-04-21 | Stop reason: HOSPADM

## 2025-04-21 RX ORDER — MORPHINE SULFATE 10 MG/ML
6 INJECTION, SOLUTION INTRAMUSCULAR; INTRAVENOUS EVERY 10 MIN PRN
Status: DISCONTINUED | OUTPATIENT
Start: 2025-04-21 | End: 2025-04-21 | Stop reason: HOSPADM

## 2025-04-21 RX ORDER — HYDROMORPHONE HYDROCHLORIDE 1 MG/ML
0.4 INJECTION, SOLUTION INTRAMUSCULAR; INTRAVENOUS; SUBCUTANEOUS EVERY 5 MIN PRN
Status: DISCONTINUED | OUTPATIENT
Start: 2025-04-21 | End: 2025-04-21 | Stop reason: HOSPADM

## 2025-04-21 RX ORDER — BUPIVACAINE HYDROCHLORIDE 5 MG/ML
INJECTION, SOLUTION EPIDURAL; INTRACAUDAL; PERINEURAL AS NEEDED
Status: DISCONTINUED | OUTPATIENT
Start: 2025-04-21 | End: 2025-04-21 | Stop reason: HOSPADM

## 2025-04-21 RX ORDER — DEXAMETHASONE SODIUM PHOSPHATE 4 MG/ML
VIAL (ML) INJECTION AS NEEDED
Status: DISCONTINUED | OUTPATIENT
Start: 2025-04-21 | End: 2025-04-21 | Stop reason: SURG

## 2025-04-21 RX ORDER — LIDOCAINE HYDROCHLORIDE 40 MG/ML
SOLUTION TOPICAL AS NEEDED
Status: DISCONTINUED | OUTPATIENT
Start: 2025-04-21 | End: 2025-04-21 | Stop reason: SURG

## 2025-04-21 RX ORDER — NALOXONE HYDROCHLORIDE 0.4 MG/ML
0.08 INJECTION, SOLUTION INTRAMUSCULAR; INTRAVENOUS; SUBCUTANEOUS AS NEEDED
Status: DISCONTINUED | OUTPATIENT
Start: 2025-04-21 | End: 2025-04-21 | Stop reason: HOSPADM

## 2025-04-21 RX ORDER — ONDANSETRON 2 MG/ML
4 INJECTION INTRAMUSCULAR; INTRAVENOUS EVERY 6 HOURS PRN
Status: DISCONTINUED | OUTPATIENT
Start: 2025-04-21 | End: 2025-04-21 | Stop reason: HOSPADM

## 2025-04-21 RX ORDER — ASCORBIC ACID 500 MG
500 TABLET ORAL 2 TIMES DAILY
Qty: 84 TABLET | Refills: 0 | Status: SHIPPED | OUTPATIENT
Start: 2025-04-21

## 2025-04-21 RX ORDER — LIDOCAINE HYDROCHLORIDE 10 MG/ML
INJECTION, SOLUTION EPIDURAL; INFILTRATION; INTRACAUDAL; PERINEURAL AS NEEDED
Status: DISCONTINUED | OUTPATIENT
Start: 2025-04-21 | End: 2025-04-21 | Stop reason: SURG

## 2025-04-21 RX ORDER — MORPHINE SULFATE 4 MG/ML
4 INJECTION, SOLUTION INTRAMUSCULAR; INTRAVENOUS EVERY 10 MIN PRN
Status: DISCONTINUED | OUTPATIENT
Start: 2025-04-21 | End: 2025-04-21 | Stop reason: HOSPADM

## 2025-04-21 RX ORDER — SODIUM CHLORIDE, SODIUM LACTATE, POTASSIUM CHLORIDE, CALCIUM CHLORIDE 600; 310; 30; 20 MG/100ML; MG/100ML; MG/100ML; MG/100ML
INJECTION, SOLUTION INTRAVENOUS CONTINUOUS
Status: DISCONTINUED | OUTPATIENT
Start: 2025-04-21 | End: 2025-04-21

## 2025-04-21 RX ORDER — SENNA AND DOCUSATE SODIUM 50; 8.6 MG/1; MG/1
2 TABLET, FILM COATED ORAL NIGHTLY
Qty: 28 TABLET | Refills: 0 | Status: SHIPPED | OUTPATIENT
Start: 2025-04-21

## 2025-04-21 RX ORDER — HYDROMORPHONE HYDROCHLORIDE 1 MG/ML
0.8 INJECTION, SOLUTION INTRAMUSCULAR; INTRAVENOUS; SUBCUTANEOUS EVERY 2 HOUR PRN
Status: DISCONTINUED | OUTPATIENT
Start: 2025-04-21 | End: 2025-04-22

## 2025-04-21 RX ORDER — ACETAMINOPHEN 500 MG
1000 TABLET ORAL ONCE
Status: COMPLETED | OUTPATIENT
Start: 2025-04-21 | End: 2025-04-21

## 2025-04-21 RX ORDER — ONDANSETRON 2 MG/ML
INJECTION INTRAMUSCULAR; INTRAVENOUS AS NEEDED
Status: DISCONTINUED | OUTPATIENT
Start: 2025-04-21 | End: 2025-04-21 | Stop reason: SURG

## 2025-04-21 RX ORDER — MORPHINE SULFATE 4 MG/ML
2 INJECTION, SOLUTION INTRAMUSCULAR; INTRAVENOUS EVERY 10 MIN PRN
Status: DISCONTINUED | OUTPATIENT
Start: 2025-04-21 | End: 2025-04-21 | Stop reason: HOSPADM

## 2025-04-21 RX ORDER — OXYCODONE HYDROCHLORIDE 5 MG/1
5 TABLET ORAL EVERY 4 HOURS PRN
Status: DISCONTINUED | OUTPATIENT
Start: 2025-04-21 | End: 2025-04-22

## 2025-04-21 RX ORDER — FAMOTIDINE 10 MG/ML
20 INJECTION, SOLUTION INTRAVENOUS ONCE
Status: COMPLETED | OUTPATIENT
Start: 2025-04-21 | End: 2025-04-21

## 2025-04-21 RX ORDER — OXYCODONE HYDROCHLORIDE 5 MG/1
10 TABLET ORAL EVERY 4 HOURS PRN
Status: DISCONTINUED | OUTPATIENT
Start: 2025-04-21 | End: 2025-04-22

## 2025-04-21 RX ORDER — METOCLOPRAMIDE HYDROCHLORIDE 5 MG/ML
10 INJECTION INTRAMUSCULAR; INTRAVENOUS ONCE
Status: COMPLETED | OUTPATIENT
Start: 2025-04-21 | End: 2025-04-21

## 2025-04-21 RX ORDER — ACETAMINOPHEN 500 MG
1000 TABLET ORAL EVERY 8 HOURS SCHEDULED
Status: DISCONTINUED | OUTPATIENT
Start: 2025-04-21 | End: 2025-04-22

## 2025-04-21 RX ORDER — ROCURONIUM BROMIDE 10 MG/ML
INJECTION, SOLUTION INTRAVENOUS AS NEEDED
Status: DISCONTINUED | OUTPATIENT
Start: 2025-04-21 | End: 2025-04-21 | Stop reason: SURG

## 2025-04-21 RX ORDER — HYDROMORPHONE HYDROCHLORIDE 1 MG/ML
0.4 INJECTION, SOLUTION INTRAMUSCULAR; INTRAVENOUS; SUBCUTANEOUS EVERY 2 HOUR PRN
Status: DISCONTINUED | OUTPATIENT
Start: 2025-04-21 | End: 2025-04-22

## 2025-04-21 RX ORDER — OXYCODONE HYDROCHLORIDE 5 MG/1
5 TABLET ORAL EVERY 4 HOURS PRN
Qty: 24 TABLET | Refills: 0 | Status: SHIPPED | OUTPATIENT
Start: 2025-04-21

## 2025-04-21 RX ORDER — HYDROMORPHONE HYDROCHLORIDE 1 MG/ML
0.6 INJECTION, SOLUTION INTRAMUSCULAR; INTRAVENOUS; SUBCUTANEOUS EVERY 5 MIN PRN
Status: DISCONTINUED | OUTPATIENT
Start: 2025-04-21 | End: 2025-04-21 | Stop reason: HOSPADM

## 2025-04-21 RX ORDER — GABAPENTIN 300 MG/1
300 CAPSULE ORAL EVERY 8 HOURS
Qty: 30 CAPSULE | Refills: 0 | Status: SHIPPED | OUTPATIENT
Start: 2025-04-21

## 2025-04-21 RX ORDER — SODIUM CHLORIDE, SODIUM LACTATE, POTASSIUM CHLORIDE, CALCIUM CHLORIDE 600; 310; 30; 20 MG/100ML; MG/100ML; MG/100ML; MG/100ML
INJECTION, SOLUTION INTRAVENOUS CONTINUOUS
Status: DISCONTINUED | OUTPATIENT
Start: 2025-04-21 | End: 2025-04-21 | Stop reason: HOSPADM

## 2025-04-21 RX ADMIN — ONDANSETRON 4 MG: 2 INJECTION INTRAMUSCULAR; INTRAVENOUS at 18:35:00

## 2025-04-21 RX ADMIN — DEXAMETHASONE SODIUM PHOSPHATE 4 MG: 4 MG/ML VIAL (ML) INJECTION at 17:43:00

## 2025-04-21 RX ADMIN — LIDOCAINE HYDROCHLORIDE 3 ML: 40 SOLUTION TOPICAL at 17:45:00

## 2025-04-21 RX ADMIN — SODIUM CHLORIDE, SODIUM LACTATE, POTASSIUM CHLORIDE, CALCIUM CHLORIDE: 600; 310; 30; 20 INJECTION, SOLUTION INTRAVENOUS at 17:40:00

## 2025-04-21 RX ADMIN — ROCURONIUM BROMIDE 50 MG: 10 INJECTION, SOLUTION INTRAVENOUS at 17:49:00

## 2025-04-21 RX ADMIN — LIDOCAINE HYDROCHLORIDE 40 MG: 10 INJECTION, SOLUTION EPIDURAL; INFILTRATION; INTRACAUDAL; PERINEURAL at 17:43:00

## 2025-04-21 NOTE — ANESTHESIA PREPROCEDURE EVALUATION
Anesthesia PreOp Note    HPI:     Min Leonard is a 64 year old male who presents for preoperative consultation requested by: Alex Matthews MD    Date of Surgery: 4/21/2025    Procedure(s):  Open reduction and internal fixation ankle fracture right  Indication: Right ankle fracture    Relevant Problems   No relevant active problems       NPO:  Last Liquid Consumption Date: 04/21/25 (smoothie)  Last Liquid Consumption Time: 0800  Last Solid Consumption Date: 04/21/25  Last Solid Consumption Time: 0800  Last Liquid Consumption Date: 04/21/25 (smoothie)          History Review:  There are no active problems to display for this patient.      Past Medical History[1]    Past Surgical History[2]    Prescriptions Prior to Admission[3]  Current Medications and Prescriptions Ordered in Epic[4]    Allergies[5]    Family History[6]  Social Hx on file[7]    Available pre-op labs reviewed.             Vital Signs:  Body mass index is 33.97 kg/m².   height is 1.753 m (5' 9\") and weight is 104.3 kg (230 lb). His oral temperature is 97.9 °F (36.6 °C). His blood pressure is 141/86 and his pulse is 65. His respiration is 17 and oxygen saturation is 97%.   Vitals:    04/18/25 1151 04/21/25 1443 04/21/25 1514 04/21/25 1536   BP:   141/86    Pulse:   65    Resp:   17    Temp:   97.9 °F (36.6 °C)    TempSrc:   Oral    SpO2:    97%   Weight: 97.5 kg (215 lb) 104.3 kg (230 lb)     Height: 1.753 m (5' 9\")           Anesthesia Evaluation     Patient summary reviewed and Nursing notes reviewed    No history of anesthetic complications   Airway   Mallampati: II  TM distance: >3 FB  Neck ROM: full  Dental - Dentition appears grossly intact         Pulmonary - normal exam   (+) sleep apnea  Cardiovascular - negative ROS and normal exam    Neuro/Psych      GI/Hepatic/Renal - negative ROS     Endo/Other - negative ROS   Abdominal                  Anesthesia Plan:   ASA:  2  Plan:   General  Airway:  ETT  Post-op Pain Management:  Oral pain medication and IV analgesics  Informed Consent Plan and Risks Discussed With:  Patient and spouse      I have informed Min Leonard and/or legal guardian or family member of the nature of the anesthetic plan, benefits, risks including possible dental damage if relevant, major complications, and any alternative forms of anesthetic management.   All of the patient's questions were answered to the best of my ability. The patient desires the anesthetic management as planned.  Rajesh Thomason MD  4/21/2025 4:53 PM  Present on Admission:  **None**           [1]   Past Medical History:   Sleep apnea    Visual impairment    glasses   [2]   Past Surgical History:  Procedure Laterality Date    Colonoscopy  10/22/2014    Edilberto Mabry MD    Colonoscopy N/A 12/04/2024    Silvana Christianson MD    Colonoscopy N/A 12/04/2024    Procedure: COLONOSCOPY;  Surgeon: Silvana Christianson MD;  Location: [facility] ENDOSCOPY    Spine surgery procedure unlisted      cage in lumbar spine   [3]   Medications Prior to Admission   Medication Sig Dispense Refill Last Dose/Taking    traMADol 50 MG Oral Tab Take 1 tablet (50 mg total) by mouth 5 (five) times daily. 150 tablet 0 4/21/2025 at  1:00 PM    diazePAM 10 MG Oral Tab Take 1 tablet (10 mg total) by mouth 3 (three) times daily as needed. 30 tablet 0 4/20/2025 at  7:00 PM    Multiple Vitamin (ONE-DAILY MULTI VITAMINS) Oral Tab Take 1 tablet by mouth in the morning.   4/20/2025 at  8:00 AM   [4]   Current Facility-Administered Medications Ordered in Epic   Medication Dose Route Frequency Provider Last Rate Last Admin    lactated ringers infusion   Intravenous Continuous Alex Matthews MD 20 mL/hr at 04/21/25 1516 New Bag at 04/21/25 1516    ceFAZolin (Ancef) 2g in 10mL IV syringe premix  2 g Intravenous Once Alex Matthews MD         No current [facility]-ordered outpatient medications on file.   [5] No Known Allergies  [6]   Family History  Problem Relation Age of Onset    Other  (COPD) Father     Colon Cancer Neg     Colon Polyps Neg    [7]   Social History  Socioeconomic History    Marital status:    Tobacco Use    Smoking status: Former     Types: Cigarettes    Smokeless tobacco: Never    Tobacco comments:     Quit 2003   Vaping Use    Vaping status: Never Used   Substance and Sexual Activity    Alcohol use: Not Currently     Comment: quit in 1987    Drug use: Never    Sexual activity: Yes     Partners: Female

## 2025-04-21 NOTE — ANESTHESIA PROCEDURE NOTES
Airway  Date/Time: 4/21/2025 5:46 PM  Reason: Elective    Airway not difficult    General Information and Staff   Patient location during procedure: OR  Anesthesiologist: Rajesh Thomason MD  Performed: anesthesiologist   Performed by: Rajesh Thomason MD  Authorized by: Rajesh Thomason MD        Indications and Patient Condition  Indications for airway management: anesthesia  Sedation level: deep      Preoxygenated: yesPatient position: sniffing    Mask difficulty assessment: 0 - not attempted    Final Airway Details    Final airway type: endotracheal airway    Successful airway: ETT  Cuffed: yes   Successful intubation technique: Video laryngoscopy  Endotracheal tube insertion site: oral  Blade: GlideScope  Blade size: #3  ETT size (mm): 7.5    Cormack-Lehane Classification: grade I - full view of glottis  Placement verified by: capnometry   Measured from: teeth  ETT to teeth (cm): 20  Number of attempts at approach: 1    Additional Comments  Atraumatic intubation. Dentition unchanged

## 2025-04-21 NOTE — OPERATIVE REPORT
PATIENT NAME: Min Leonard   DATE OF OPERATION: 4/21/2025      PREOPERATIVE DIAGNOSIS: Right ankle Maissoneuvre fracture, closed, displaced,  with syndesmotic dysruption  POSTOPERATIVE DIAGNOSIS:  Right ankle Maissoneuvre fracture, closed, displaced, with syndesmotic dysruption     PROCEDURE PERFORMED:    1.  Open reduction internal fixation Right ankle tibiofibular joint including syndesmosis 70129     STAFF SURGEON:  Alex Matthews MD   FIRST ASSISTANT: Chaka Hayward  ANESTHESIA:  General    ANTIBIOTICS:   Ancef 2 grams.     IMPLANTS:  Implant Name Type Inv. Item Serial No.  Lot No. LRB No. Used Action   SYSTEM IMPL 2 H 1.5MM BTTRS PLT TI THRD BB [employer] - SN/A  SYSTEM IMPL 2 H 1.5MM BTTRS PLT TI THRD BB [employer] N/A [employer] WD [id number] Right 1 Implanted        COMPLICATIONS:  None.   CONDITION:  Stable to post anesthesia care unit.      INDICATION FOR PROCEDURE: Min Leonard  is a 64 year old male who presented with the above diagnosis after fall at work.  The patient sustained an unstable fracture pattern with high risk for persistent displacement and loss of function.  We discussed the risks and benefits of operative versus nonoperative management. The risks of nonoperative management specifically the risk of malunion, nonunion, loss of mobility/function, and persistent pain were discussed and the patient elected to undergo operative treatment.  We discussed benefits of the surgery including return of function and stability and for pain management. We discussed alternative options including nonoperative management and alternative forms of fixation. We additionally discussed the risks of surgery, which include, but are not limited to bleeding, pain, infection, damage to nerves/vessels/tendons, incomplete relief of pain, incomplete return of function, nonunion, need for additional surgery, prominent hardware, blood clots, and death. The patient understands the above  risks and elected to proceed.     DESCRIPTION OF THE OPERATION:  On the day of the procedure, the patient was met in the preoperative holding area where the consent form was verified and the correct patient, laterality and procedure were identified and found to be correct.  The operative extremity was marked with indelible ink and the patient was then met by the anesthesia provider.  The patient was subsequently transferred to the operating room and placed in the supine position on the operating room table with appropriate bump.  All bony prominences were well padded. Once adequate anesthesia was induced and prophylactic antibiotics were infused, the dressings to the operative lower extremity were removed and a tourniquet was placed high on the leg. The leg was then prepped and draped in the usual sterile fashion.      A surgical time-out was performed prior to the initiation of the surgery and the consent form was verified and the patient, laterality, and correct procedure were verified and found to be correct.  An Esmarch bandage was used to exsanguinate the Right lower extremity and the tourniquet was inflated to 200 mmHg.      Distal Fibula ORIF    A 3 cm incision was made directly posterolateral to the lateral malleolus in line with the fibula at the level of the syndesmosis.  Sharp and dull dissection was carried down to the osseous surface, taking care to identify and protect the peroneal nerve branch. An appropriately sized 2-hole plate was chosen and positioned into place and held there using an olive wire.  With the ankle in maximum dorsiflexion, a large pointed reduction clamp was used between the fibula and tibia to close the syndesmosis. The [employer] tightrope system was then used to drill and position two tightrope implants through both of the screw holes at the level of the syndesmosis. This was deployed appropriately on the medial cortex and the syndesmotic fixation construct was tightened after  removing the olive wire. This was then repeated proximally through the other plate hole. Once both were appropriately tightened, the clamp was removed and tails were cut.      Final fluoroscopic imaging confirmed anatomic reduction of syndesmosis with appropriate hardware positioning and no gapping on stress testing. The wound was then copiously irrigated with normal saline. The tourniquet was then let down and good perfusion was confirmed distally    The wounds were then closed in a layered fashion with 2-0 vicryl subcutaneous sutures followed by 3-0 running alternating simple horizontal mattress nylon suture.  The wounds were then cleaned and dressed with dermabond, 4x4's, and webril.  A short leg splint was applied and overwrapped with ACE bandages.  Once the splint had hardened, the patient was awoken from anesthesia without complication.  The patient was subsequently transferred to the post-anesthesia care unit in good condition     POSTOPERATIVE INSTRUCTIONS:   - NWB  - Start PT/OT  - Pain protocol including elevation, icing, acetaminophen, NSAIDs, gabapentin, and oxycodone as needed  - Patient will follow up with me at two weeks     First Assist Necessity and Involvement     For this procedure, a surgical assistant was present for, and assisted with, the entirety of the case. The surgical assistant was involved with patient positioning, prepping and draping, directly assisting with key portions of the case including retracting and/or managing instrumentation, and closing. The surgical assistant was necessary to ensure greater likelihood of a safe and efficient operation, and no Orthopaedic Surgery resident was available to operate as an assistant.

## 2025-04-21 NOTE — INTERVAL H&P NOTE
Pre-op Diagnosis: Right ankle fracture    The above referenced H&P was reviewed by Alex Matthews MD on 4/21/2025, the patient was examined and no significant changes have occurred in the patient's condition since the H&P was performed.  I discussed with the patient and/or legal representative the potential benefits, risks and side effects of this procedure; the likelihood of the patient achieving goals; and potential problems that might occur during recuperation.  I discussed reasonable alternatives to the procedure, including risks, benefits and side effects related to the alternatives and risks related to not receiving this procedure.  We will proceed with procedure as planned.

## 2025-04-22 NOTE — ANESTHESIA POSTPROCEDURE EVALUATION
Patient: Min Leonard    Procedure Summary       Date: 04/21/25 Room / Location: [facility] MAIN OR  / [facility] MAIN OR    Anesthesia Start: 1739 Anesthesia Stop: 1853    Procedure: Open reduction and internal fixation ankle fracture right (Right: Ankle) Diagnosis: (Right ankle fracture)    Surgeons: Alex Matthews MD Anesthesiologist: Rajesh Thomason MD    Anesthesia Type: general ASA Status: 2            Anesthesia Type: general    Vitals Value Taken Time   /91 04/21/25 19:23   Temp 97.8 °F (36.6 °C) 04/21/25 18:55   Pulse 65 04/21/25 19:26   Resp 16 04/21/25 19:26   SpO2 98 % 04/21/25 19:26   Vitals shown include unfiled device data.    [facility] AN Post Evaluation:   Patient Evaluated in PACU  Patient Participation: complete - patient participated  Level of Consciousness: awake and alert  Pain Score: 0  Pain Management: adequate  Airway Patency:patent  Dental exam unchanged from preop  Yes    Nausea/Vomiting: none  Cardiovascular Status: acceptable  Respiratory Status: acceptable  Postoperative Hydration acceptable      Rajesh Thomason MD  4/21/2025 7:27 PM

## 2025-04-24 DIAGNOSIS — M51.9 LUMBAR DISC DISEASE: ICD-10-CM

## 2025-04-24 DIAGNOSIS — Z98.1 HISTORY OF LUMBAR FUSION: ICD-10-CM

## 2025-04-24 DIAGNOSIS — F41.9 ANXIETY: ICD-10-CM

## 2025-04-26 DIAGNOSIS — Z47.89 ORTHOPEDIC AFTERCARE: Primary | ICD-10-CM

## 2025-04-26 RX ORDER — OXYCODONE HYDROCHLORIDE 5 MG/1
5 TABLET ORAL
Qty: 30 TABLET | Refills: 0 | Status: SHIPPED | OUTPATIENT
Start: 2025-04-26

## 2025-04-29 DIAGNOSIS — Z47.89 ORTHOPEDIC AFTERCARE: Primary | ICD-10-CM

## 2025-04-29 RX ORDER — OXYCODONE HYDROCHLORIDE 5 MG/1
5 TABLET ORAL EVERY 4 HOURS PRN
Qty: 24 TABLET | Refills: 0 | Status: SHIPPED | OUTPATIENT
Start: 2025-04-29

## 2025-04-30 NOTE — TELEPHONE ENCOUNTER
Dispense date is not determined by last refill date.    **Patient is not due for a refill on their Tramadol until 5/11/2025 (this information is based on the last 6 dispenses recorded from the [facility]). Please advise**

## 2025-04-30 NOTE — TELEPHONE ENCOUNTER
Dear nursing staff,    Please call patient to clarify refill request for Diazepam and Tramadol.    Please discuss how many patient is taking a day as per  patient should have enough til 5/10/2025, Dr. Chaney has patient a hold on filling til 5/11/2025.    Thank You ,  Lashell Mcmillan

## 2025-04-30 NOTE — TELEPHONE ENCOUNTER
please advise on refill request. Please see Lashell Welch [facility] message below. Thank you    Patient was called and he stated that he takes Tramadol 50 mg he takes 5 pills a day.  For the diazepam he takes 1 pill a day.  Patient stated that he will not have enough through May 10, 2025 as we lose 5 days a years. Patient stated that he last picked up on April 3,2025

## 2025-05-02 RX ORDER — TRAMADOL HYDROCHLORIDE 50 MG/1
50 TABLET ORAL
Qty: 150 TABLET | Refills: 0 | Status: SHIPPED | OUTPATIENT
Start: 2025-05-02

## 2025-05-02 RX ORDER — DIAZEPAM 10 MG/1
10 TABLET ORAL 3 TIMES DAILY PRN
Qty: 30 TABLET | Refills: 0 | Status: SHIPPED | OUTPATIENT
Start: 2025-05-02

## 2025-05-05 ENCOUNTER — TELEPHONE (OUTPATIENT)
Dept: INTERNAL MEDICINE CLINIC | Facility: CLINIC | Age: 65
End: 2025-05-05

## 2025-05-05 NOTE — TELEPHONE ENCOUNTER
Patient due to sign Chronic pain management  Agreement.. Patient last seen in office on 4/7/25, no future scheduled appointments. Patient contacted, appointment scheduled with Dr Chaney for 6/4/25 at 2:20 pm.

## 2025-06-04 ENCOUNTER — OFFICE VISIT (OUTPATIENT)
Dept: INTERNAL MEDICINE CLINIC | Facility: CLINIC | Age: 65
End: 2025-06-04
Payer: COMMERCIAL

## 2025-06-04 VITALS
HEIGHT: 69 IN | TEMPERATURE: 98 F | BODY MASS INDEX: 34.07 KG/M2 | RESPIRATION RATE: 20 BRPM | HEART RATE: 86 BPM | DIASTOLIC BLOOD PRESSURE: 88 MMHG | OXYGEN SATURATION: 96 % | WEIGHT: 230 LBS | SYSTOLIC BLOOD PRESSURE: 134 MMHG

## 2025-06-04 DIAGNOSIS — R03.0 ELEVATED BP WITHOUT DIAGNOSIS OF HYPERTENSION: ICD-10-CM

## 2025-06-04 DIAGNOSIS — F41.9 ANXIETY: ICD-10-CM

## 2025-06-04 DIAGNOSIS — M25.571 ACUTE RIGHT ANKLE PAIN: ICD-10-CM

## 2025-06-04 DIAGNOSIS — M51.9 LUMBAR DISC DISEASE: Primary | ICD-10-CM

## 2025-06-04 DIAGNOSIS — Z98.1 HISTORY OF LUMBAR FUSION: ICD-10-CM

## 2025-06-04 PROCEDURE — 3075F SYST BP GE 130 - 139MM HG: CPT | Performed by: INTERNAL MEDICINE

## 2025-06-04 PROCEDURE — 3008F BODY MASS INDEX DOCD: CPT | Performed by: INTERNAL MEDICINE

## 2025-06-04 PROCEDURE — 99214 OFFICE O/P EST MOD 30 MIN: CPT | Performed by: INTERNAL MEDICINE

## 2025-06-04 PROCEDURE — G2211 COMPLEX E/M VISIT ADD ON: HCPCS | Performed by: INTERNAL MEDICINE

## 2025-06-04 PROCEDURE — 3079F DIAST BP 80-89 MM HG: CPT | Performed by: INTERNAL MEDICINE

## 2025-06-04 RX ORDER — TRAMADOL HYDROCHLORIDE 50 MG/1
50 TABLET ORAL
Qty: 150 TABLET | Refills: 1 | Status: SHIPPED | OUTPATIENT
Start: 2025-06-04

## 2025-06-04 RX ORDER — DIAZEPAM 10 MG/1
10 TABLET ORAL 3 TIMES DAILY PRN
Qty: 30 TABLET | Refills: 1 | Status: SHIPPED | OUTPATIENT
Start: 2025-06-04

## 2025-06-04 NOTE — PATIENT INSTRUCTIONS
Controlling High Blood Pressure   High blood pressure (hypertension) is often called the silent killer. This is because many people who have it, don’t know it. It can be very dangerous. High blood pressure can raise your risk of heart attack, stroke, heart disease, and heart failure. Controlling your blood pressure can lower your risk of these problems. It's important to check yourblood pressure regularly. It can save your life.   Blood pressure measurements are given as 2 numbers. Systolic blood pressure is the upper number. This is the pressure when the heart contracts. Diastolic blood pressure is the lower number. This is the pressure when the heartrelaxes between beats.   Blood pressure is grouped like this:   Normal blood pressure. This is systolic of less than 120 and diastolic of less than 80 (120/80).  Elevated blood pressure.  This is systolic of 120 to 129 and diastolic less than 80.  Stage 1 high blood pressure.  This is systolic of 130 to 139 or diastolic between 80 to 89.  Stage 2 high blood pressure.  This is systolic of 140 or higher or diastolic of 90 or higher.  A heart-healthy lifestyle can help you control your blood pressure withoutmedicines. Below are some things you can do to have a heart-healthy lifestyle.     Eat heart-healthy foods   Choose low-salt, low-fat foods. Limit your sodium to 2,300 mg per day or the amount advised by your healthcare provider.  Limit canned, dried, cured, packaged, and fast foods. These can contain a lot of salt.  Eat 8 to 10 servings of fruits and vegetables every day.  Choose lean meats, fish, or chicken.  Eat whole-grain pasta, brown rice, and beans.  Eat 2 to 3 servings of low-fat or fat-free dairy products.  Ask your doctor about the DASH eating plan. This plan helps reduce blood pressure.  When you go to a restaurant, ask that your meal be made with no added salt.    Stay at a healthy weight   Ask your healthcare provider how many calories to eat a day. Then  stick to that number.  Ask your provider what weight range is healthiest for you. If you are overweight, a weight loss of only 3% to 5% of your body weight can help lower blood pressure. A good weight loss goal is to lose 10% of your body weight in a year.  Limit snacks and sweets.  Get regular exercise.    Get more active   Find activities you enjoy. They can be done alone or with friends or family. Try bicycling, dancing, walking, or jogging.  Park farther away from building entrances to walk more.  Use stairs instead of the elevator.  When you can, walk or bike instead of driving.  [place] leaves, garden, or do household repairs.  Be active at a moderate to vigorous level of physical activity for at least 30 minutes a day for at least 5 days a week.     Manage stress   Make time to relax and enjoy life. Find time to laugh.  Talk about your concerns with your loved ones and your healthcare provider.  Visit with family and friends, and keep up with hobbies.    Limit alcohol and quit smoking   Men should have no more than 2 drinks per day.  Women should have no more than 1 drink per day.  If you smoke, make a plan to stop. Talk with your healthcare provider for help. Smoking greatly raises your risk for heart disease and stroke. Ask your provider about stop-smoking programs and other support.    Blood pressure medicines  If your lifestyle changes aren’t enough, your healthcare provider may prescribe high blood pressure medicine. Take all medicines as prescribed. If you have any questions about yourmedicines, ask your provider before stopping or changing them.   [place] last reviewed this educational content on12/1/2021 © 2000-2022 [employer]. All rights reserved. This information is not intended as a substitute for professional medical care. Always follow yourMc[facility] Hospitalcare professional's instruction    Video HealthShe[employer]™  What is High Blood Pressure?  Understand what blood pressure is, the health risks  of having high blood pressure, the factors that put you at risk for having high blood pressure, and the importance of working with your healthcare provider to control it.  To watch the video:  Scan the QR code  Using your mobile device, scan the following code:  OR  Go to the website:  Storie  Enter the prescription code:  3414E    © 2000-2022 [employer]. All rights reserved. This information is not intended as a substitute for professional medical care. Always follow your healthcare professional's instructions.    Video [employer]  Eating Well with High Blood Pressure  Certain foods can make your blood pressure go too high. Watch and learn how easy it is to have delicious meals without harming your health.     To watch the video:  Scan the QR code  Using your mobile device, scan the following code:  OR  Go to the website:  [email or web address].com  Enter the prescription code:   QIX       © 2000-2022 [employer]. All rights reserved. This information is not intended as a substitute for professional medical care. Always follow your healthcare professional's instructions.    Taking Your Blood Pressure  Blood pressure is the force of blood against the artery wall as it moves from the heart through the blood vessels. You can take your own blood pressure reading using a digital monitor. Take your readings the same each time, usingthe same arm. Take readings as often as your healthcare provider advises.   About blood pressure monitors  Blood pressure monitors are designed for certain ages and cases. You can find monitors for older adults, for pregnant women, and for children. Make sure theone you choose is the right one for your age and situation.   Experts advise an automatic cuff monitor that fits on your upper arm (bicep). The cuff should fit your arm size. A cuff that’s too large or too small won't give an accurate reading. Measure around yourupper arm to find your  size.   Monitors that attach to your finger or wrist are not as accurate as monitorsfor your upper arm.   Ask your healthcare provider for help in choosing a monitor. Bring your monitorto your next provider visit if you need help in using it the correct way.   The steps below are general instructions for using an automatic digitalmonitor.   Step 1. Relax    Take your blood pressure at the same time every day, such as in the morning or evening. Or take it at the time your healthcare provider advises.  Wait at least 30 minutes after smoking, eating, or exercising. Don't drink coffee, tea, soda, or other caffeinated drinks before checking your blood pressure. Use the restroom beforehand.  Sit comfortably at a table with both feet on the floor. Don't cross your legs or feet. Place the monitor near you.  Rest for at least 5 minutes before you begin. Make sure there are no distractions. This includes TV, cell phones, and other electronics. Wait to have conversations with others until after you measure you blood pressure.  Step 2. Wrap the cuff    Place your arm on the table, palm up. Your arm should be at the level of your heart. Wrap the cuff around your upper arm, just above your elbow. It’s best done on bare skin, not over clothing. Most cuffs will show you where the blood vessel in the middle of the arm at the inner side of the elbow (the brachial artery) should line up with the cuff. Look in your monitor's instruction booklet for an illustration. You can also bring your cuff to your healthcare provider and have them show you how to correctly place the cuff.  Step 3. Inflate the cuff    Push the button that starts the pump.  The cuff will tighten, then loosen.  The numbers will change. When they stop changing, your blood pressure reading will appear.  Take 2 or 3 readings 1 minute apart, or as advised by your provider.  Step 4. Write down the results of each reading    Write down your blood pressure numbers for each  reading. Note the date and time. Keep your results in 1 place, such as a notebook. Even if your monitor has a built-in memory, keep a hard copy of the readings.  Remove the cuff from your arm. Turn off the machine.  Bring your blood pressure records with you to each provider visit.  If you start a new blood pressure medicine, note the day you started the new medicine. Also note the day if you change the dose of your medicine. Measure your blood pressure before your take your medicine. This information goes on your blood pressure recording sheet. This will help your provider check how well the medicine changes are working.  Ask your provider what numbers mean that you should call them. Also ask what numbers mean that you should get help right away.  [place] last reviewed this educational content on12/1/2021 © 2000-2022 [employer]. All rights reserved. This information is not intended as a substitute for professional medical care. Always follow yourhealthcare professional's instructions.      Cymbalta/duloxetine- medication that may help with anxiety and chronic pain

## 2025-06-04 NOTE — PROGRESS NOTES
HPI:      Patient ID: Min Leonard is a 64 year old male.    HPI    Patient returns to the office today to diScuss chronic medical issues as listed on the active problem list below.  Patient last seen in the office by me on   August 14 of last year for his first office visit.  At that time discussed his history of back problems.  Long history of lumbar disc disease with a couple of surgeries in the past including a fusion.  The time of his last visit, he was taking tramadol 5 pills a day.  During the last visit, the following changes were made: None.  Since the last visit, the patient has seen the following doctors: Orthopedics.  Also saw by nurse practitioner here for ER follow-up after an ankle injury.    Today, the patient offers the following complaints: On 3/20, he fell at work and injured his right ankle; taken to the ER; did Xrays and sent home with a boot. Later Xray showed a fracture. Orthopedic doctor did a procedure. Fracture has been improving. He is getting PT. [employer] to return to work in July 14. His back is no worse with the recent injuries. He is not interested in further back surgery. He has used marijuana in the past but not recently.   Mood is the same; he is an anxious person. He has been on other meds. He has been on Prozac, Zoloft, Wellbutrin. None of them helped.   Patient describes diet as good.   For exercise, the patient is much less active with the injury. Just started walking without the scooter.   Tobacco and alcohol use reviewed.   Current medications reviewed. Takes one valium a day. Not using oxycodone. Still on tramadol 5 a day for a couple years. He has tried to decrease the daily dose but did not tolerate it. Gabapentin did not clearly help.   Health maintenance issues reviewed.    Wt Readings from Last 6 Encounters:   06/04/25 230 lb (104.3 kg)   04/21/25 230 lb (104.3 kg)   04/07/25 215 lb (97.5 kg)   11/25/24 225 lb (102.1 kg)   08/14/24 227 lb (103 kg)        Problem List[1]     HISTORY:  Past Medical History[2]   Past Surgical History[3]   Family History[4]   Short Social Hx on File[5]       Review of Systems        Current Medications[6]  Allergies:Allergies[7]     PHYSICAL EXAM:   /82 (BP Location: Left arm, Patient Position: Sitting, Cuff Size: large)   Pulse 86   Temp 98.2 °F (36.8 °C) (Other)   Resp 20   Ht 5' 9\" (1.753 m)   Wt 230 lb (104.3 kg)   SpO2 96%   BMI 33.97 kg/m²      Physical Exam  Constitutional:       Appearance: Normal appearance. He is obese.   Cardiovascular:      Rate and Rhythm: Normal rate and regular rhythm.      Pulses: Normal pulses.      Heart sounds: Normal heart sounds.   Pulmonary:      Effort: Pulmonary effort is normal.      Breath sounds: Normal breath sounds.   Neurological:      Mental Status: He is alert.                 ASSESSMENT/PLAN:   1. Lumbar disc disease  Patient with chronic back pain history of surgeries.  Now on tramadol 5 pills a day.  It has been pretty steady.  No acceleration in use.  Patient signed to the pain agreement.  Spent time discussing his pain control issues.  Discussed various alternatives.  Encouraged him to look into the Cymbalta/duloxetine as a possible option that may help his anxiety and his pain.  Discussed concerns about coadministration of diazepam and tramadol.  He needs to be careful to minimize overlap with those medications.    Today, KATY Chaney MD reviewed the Patient Provider Pain Agreement (PPPA) with Min Leonard on 06/04/25. We discussed the goals of chronic pain management, risks of chronic opioid therapy, and conditions for continued treatment.    Pain Agreement    - traMADol 50 MG Oral Tab; Take 1 tablet (50 mg total) by mouth 5 (five) times daily.  Dispense: 150 tablet; Refill: 1    2. Acute right ankle pain  Patient with recent injury and apparent fracture.  Follow-up with orthopedics.    3. Elevated BP without diagnosis of hypertension  Mild  elevation of blood pressure.  May be related to his recent decreased activity because of the injury.  Given printed information and access to educational videos about lifestyle and blood pressure.  Return in August or September for general physical exam.    4. History of lumbar fusion  Stable with pain medications as above.  Refill given on tramadol.  - traMADol 50 MG Oral Tab; Take 1 tablet (50 mg total) by mouth 5 (five) times daily.  Dispense: 150 tablet; Refill: 1    5. Anxiety  Stable.  He takes diazepam as needed.  Previous trials of SSRI medications have not been successful.  - diazePAM 10 MG Oral Tab; Take 1 tablet (10 mg total) by mouth 3 (three) times daily as needed.  Dispense: 30 tablet; Refill: 1      Meds This Visit:  Requested Prescriptions      No prescriptions requested or ordered in this encounter       Imaging & Referrals:  None         Min Chaney MD        [1]   Patient Active Problem List  Diagnosis    Closed displaced Maisonneuve fracture of left lower extremity    Orthopedic aftercare   [2]   Past Medical History:   Sleep apnea    Visual impairment    glasses   [3]   Past Surgical History:  Procedure Laterality Date    Colonoscopy  10/22/2014    Edilberto Mabry MD    Colonoscopy N/A 12/04/2024    Silvana Christianson MD    Colonoscopy N/A 12/04/2024    Procedure: COLONOSCOPY;  Surgeon: Silvana Christianson MD;  [facility] ENDOSCOPY    Spine surgery procedure unlisted      cage in lumbar spine   [4]   Family History  Problem Relation Age of Onset    Other (COPD) Father     Colon Cancer Neg     Colon Polyps Neg    [5]   Social History  Socioeconomic History    Marital status:    Tobacco Use    Smoking status: Former     Types: Cigarettes    Smokeless tobacco: Never    Tobacco comments:     Quit 2003   Vaping Use    Vaping status: Never Used   Substance and Sexual Activity    Alcohol use: Not Currently     Comment: quit in 1987    Drug use: Never    Sexual activity: Yes     Partners: Female      Social Drivers of Health     Food Insecurity: No Food Insecurity (6/4/2025)    NCSS - Food Insecurity     Worried About Running Out of Food in the Last Year: No     Ran Out of Food in the Last Year: No   Transportation Needs: No Transportation Needs (6/4/2025)    NCSS - Transportation     Lack of Transportation: No   Housing Stability: Not At Risk (6/4/2025)    NCSS - Housing/Utilities     Has Housing: Yes     Worried About Losing Housing: No     Unable to Get Utilities: No   [6]   Current Outpatient Medications   Medication Sig Dispense Refill    traMADol 50 MG Oral Tab Take 1 tablet (50 mg total) by mouth 5 (five) times daily. 150 tablet 0    oxyCODONE 5 MG Oral Tab Take 1 tablet (5 mg total) by mouth every 4 to 6 hours as needed for Pain. 30 tablet 0    Acetaminophen 500 MG Oral Cap Take 2 capsules (1,000 mg total) by mouth every 8 (eight) hours as needed for Pain. Never exceed 3000 mg in a 24-hour period.  Many over-the-counter medications also have acetaminophen in them. 180 capsule 0    senna-docusate 8.6-50 MG Oral Tab Take 2 tablets by mouth at bedtime. 28 tablet 0    Vitamin C 500 MG Oral Tab Take 1 tablet (500 mg total) by mouth in the morning and 1 tablet (500 mg total) before bedtime. 84 tablet 0    Multiple Vitamin (ONE-DAILY MULTI VITAMINS) Oral Tab Take 1 tablet by mouth in the morning.      diazePAM 10 MG Oral Tab Take 1 tablet (10 mg total) by mouth 3 (three) times daily as needed. (Patient not taking: Reported on 6/4/2025) 30 tablet 0    oxyCODONE 5 MG Oral Tab Take 1 tablet (5 mg total) by mouth every 4 (four) hours as needed for Pain. 24 tablet 0    gabapentin 300 MG Oral Cap Take 1 capsule (300 mg total) by mouth every 8 (eight) hours. (Patient not taking: Reported on 6/4/2025) 30 capsule 0    oxyCODONE 5 MG Oral Tab Take 1 tablet (5 mg total) by mouth every 4 (four) hours as needed for Pain. 24 tablet 0    ondansetron 4 MG Oral Tablet Dispersible Take 1 tablet (4 mg total) by mouth every 8  (eight) hours as needed for Nausea. (Patient not taking: Reported on 6/4/2025) 10 tablet 0   [7] No Known Allergies

## 2025-07-28 DIAGNOSIS — F41.9 ANXIETY: ICD-10-CM

## 2025-07-28 DIAGNOSIS — M51.9 LUMBAR DISC DISEASE: ICD-10-CM

## 2025-07-28 DIAGNOSIS — Z98.1 HISTORY OF LUMBAR FUSION: ICD-10-CM

## 2025-07-30 RX ORDER — DIAZEPAM 10 MG/1
10 TABLET ORAL 3 TIMES DAILY PRN
Qty: 30 TABLET | Refills: 1 | Status: SHIPPED | OUTPATIENT
Start: 2025-07-30

## 2025-07-30 RX ORDER — TRAMADOL HYDROCHLORIDE 50 MG/1
50 TABLET ORAL
Qty: 150 TABLET | Refills: 1 | Status: SHIPPED | OUTPATIENT
Start: 2025-07-30

## (undated) DEVICE — GAMMEX® PI HYBRID SIZE 7, STERILE POWDER-FREE SURGICAL GLOVE, POLYISOPRENE AND NEOPRENE BLEND: Brand: GAMMEX

## (undated) DEVICE — DISPOSABLE TOURNIQUET CUFF SINGLE BLADDER, DUAL PORT AND QUICK CONNECT CONNECTOR: Brand: COLOR CUFF

## (undated) DEVICE — SYRINGE, LUER SLIP, STERILE, 60ML: Brand: MEDLINE

## (undated) DEVICE — GIJAW SINGLE-USE BIOPSY FORCEPS WITH NEEDLE: Brand: GIJAW

## (undated) DEVICE — ADHESIVE SKIN TOP FOR WND CLSR DERMBND ADV

## (undated) DEVICE — ENCORE® LATEX MICRO SIZE 8.5, STERILE LATEX POWDER-FREE SURGICAL GLOVE: Brand: ENCORE

## (undated) DEVICE — INTENDED FOR TISSUE SEPARATION, AND OTHER PROCEDURES THAT REQUIRE A SHARP SURGICAL BLADE TO PUNCTURE OR CUT.: Brand: BARD-PARKER ® STAINLESS STEEL BLADES

## (undated) DEVICE — ANTIBACTERIAL UNDYED BRAIDED (POLYGLACTIN 910), SYNTHETIC ABSORBABLE SUTURE: Brand: COATED VICRYL

## (undated) DEVICE — SPLINT ONESTEP 5X30IN FBRGLS MOLD

## (undated) DEVICE — MEDI-VAC NON-CONDUCTIVE SUCTION TUBING 6MM X 1.8M (6FT.) L: Brand: CARDINAL HEALTH

## (undated) DEVICE — V2 SPECIMEN COLLECTION MANIFOLD KIT: Brand: NEPTUNE

## (undated) DEVICE — ZZ-CONVERTED-TO-522442- SPONGE 4X4 10PK

## (undated) DEVICE — C-ARM: Brand: UNBRANDED

## (undated) DEVICE — CO2 CANNULA,SSOFT,ADLT,7O2,4CO2,FEMALE: Brand: MEDLINE

## (undated) DEVICE — SUT ETHLN 3-0 30IN FS-1 NABSRB BLK 24MM 3/8 C

## (undated) DEVICE — TOWEL,OR,DSP,ST,BLUE,DLX,2/PK,40PK/CS: Brand: MEDLINE

## (undated) DEVICE — COTTON UNDERCAST PADDING,REGULAR FINISH: Brand: WEBRIL

## (undated) DEVICE — SOLUTION DURAPREP 26ML APPLICATOR

## (undated) DEVICE — KIT ENDO ORCAPOD 160/180/190

## (undated) DEVICE — SUCTION CANISTER, 3000CC,SAFELINER: Brand: DEROYAL

## (undated) DEVICE — 3M™ IOBAN™ 2 ANTIMICROBIAL INCISE DRAPE 6650EZ: Brand: IOBAN™ 2

## (undated) DEVICE — PACK CDS LOWER EXTREMITY

## (undated) DEVICE — KIT CLEAN ENDOKIT 1.1OZ GOWNX2

## (undated) DEVICE — SOLUTION IRRIG 1000ML 0.9% NACL USP BTL

## (undated) DEVICE — TETRA-FLEX CF WOVEN LATEX FREE ELASTIC BANDAGE 4" X 5.5 YD: Brand: TETRA-FLEX™CF

## (undated) DEVICE — GAMMEX® NON-LATEX PI ORTHO SIZE 8.5, STERILE POLYISOPRENE POWDER-FREE SURGICAL GLOVE: Brand: GAMMEX

## (undated) NOTE — LETTER
[facility]  Administration of Anesthesia  I, Min Leonard agree to be cared for by a physician anesthesiologist alone and/or with a nurse anesthetist, who is specially trained to monitor me and give me medicine to put me to sleep or keep me comfortable during my procedure    I understand that my anesthesiologist and/or anesthetist is not an employee or agent of [facility] or [employer]. He or she works for [facility]    As the patient asking for anesthesia services, I agree to:  Allow the anesthesiologist (anesthesia doctor) to give me medicine and do additional procedures as necessary. Some examples are: Starting or using an “IV” to give me medicine, fluids or blood during my procedure, and having a breathing tube placed to help me breathe when I’m asleep (intubation). In the event that my heart stops working properly, I understand that my anesthesiologist will make every effort to sustain my life, unless otherwise directed by [facility] Do Not Resuscitate documents.  Tell my anesthesia doctor before my procedure:  If I am pregnant.  The last time that I ate or drank.  iii. All of the medicines I take (including prescriptions, herbal supplements, and pills I can buy without a prescription (including street drugs/illegal medications). Failure to inform my anesthesiologist about these medicines may increase my risk of anesthetic complications.  iv.If I am allergic to anything or have had a reaction to anesthesia before.  I understand how the anesthesia medicine will help me (benefits).  I understand that with any type of anesthesia medicine there are risks:  The most common risks are: nausea, vomiting, sore throat, muscle soreness, damage to my eyes, mouth, or teeth (from breathing tube placement).  Rare risks include: remembering what happened during my procedure, allergic reactions to medications, injury to my airway, heart, lungs, vision,  nerves, or muscles and in extremely rare instances death.  My doctor has explained to me other choices available to me for my care (alternatives).  Pregnant Patients (“epidural”):  I understand that the risks of having an epidural (medicine given into my back to help control pain during labor), include itching, low blood pressure, difficulty urinating, headache or slowing of the baby’s heart. Very rare risks include infection, bleeding, seizure, irregular heart rhythms and nerve injury.  Regional Anesthesia (“spinal”, “epidural”, & “nerve blocks”):  I understand that rare but potential complications include headache, bleeding, infection, seizure, irregular heart rhythms, and nerve injury.    _____________________________________________________________________________  Patient (or Representative) Signature/Relationship to Patient  Date   Time    _____________________________________________________________________________   Name (if used)    Language/Organization   Time    _____________________________________________________________________________  Nurse Anesthetist Signature     Date   Time  _____________________________________________________________________________  Anesthesiologist Signature     Date   Time  I have discussed the procedure and information above with the patient (or patient’s representative) and answered their questions. The patient or their representative has agreed to have anesthesia services.    _____________________________________________________________________________  Witness        Date   Time  I have verified that the signature is that of the patient or patient’s representative, and that it was signed before the procedure  Patient Name: Min Leonard     : 10/3/1960                 Printed: 2024 at 10:44 AM    Medical Record #: [id number]                                            Page 1 of 1  ----------ANESTHESIA CONSENT----------

## (undated) NOTE — LETTER
[facility] [address], [place], [place]    Authorization for Surgical Operation and Procedure                               I hereby authorize Silvana Christianson MD, my physician and his/her assistants (if applicable), which may include medical students, residents, and/or fellows, to perform the following surgical operation/ procedure and administer such anesthesia as may be determined necessary by my physician: Operation/Procedure name (s) COLONOSCOPY on Min Leonard   2.   I recognize that during the surgical operation/procedure, unforeseen conditions may necessitate additional or different procedures than those listed above.  I, therefore, further authorize and request that the above-named surgeon, assistants, or designees perform such procedures as are, in their judgment, necessary and desirable.    3.   My surgeon/physician has discussed prior to my surgery the potential benefits, risks and side effects of this procedure; the likelihood of achieving goals; and potential problems that might occur during recuperation.  They also discussed reasonable alternatives to the procedure, including risks, benefits, and side effects related to the alternatives and risks related to not receiving this procedure.  I have had all my questions answered and I acknowledge that no guarantee has been made as to the result that may be obtained.    4.   Should the need arise during my operation/procedure, which includes change of level of care prior to discharge, I also consent to the administration of blood and/or blood products.  Further, I understand that despite careful testing and screening of blood or blood products by collecting agencies, I may still be subject to ill effects as a result of receiving a blood transfusion and/or blood products.  The following are some, but not all, of the potential risks that can occur: fever and allergic reactions, hemolytic reactions, transmission of  diseases such as Hepatitis, AIDS and Cytomegalovirus (CMV) and fluid overload.  In the event that I wish to have an autologous transfusion of my own blood, or a directed donor transfusion, I will discuss this with my physician.  Check only if Refusing Blood or Blood Products  I understand refusal of blood or blood products as deemed necessary by my physician may have serious consequences to my condition to include possible death. I hereby assume responsibility for my refusal and release the hospital, its personnel, and my physicians from any responsibility for the consequences of my refusal.    o  Refuse   5.   I authorize the use of any specimen, organs, tissues, body parts or foreign objects that may be removed from my body during the operation/procedure for diagnosis, research or teaching purposes and their subsequent disposal by hospital authorities.  I also authorize the release of specimen test results and/or written reports to my treating physician on the hospital medical staff or other referring or consulting physicians involved in my care, at the discretion of the Pathologist or my treating physician.    6.   I consent to the photographing or videotaping of the operations or procedures to be performed, including appropriate portions of my body for medical, scientific, or educational purposes, provided my identity is not revealed by the pictures or by descriptive texts accompanying them.  If the procedure has been photographed/videotaped, the surgeon will obtain the original picture, image, videotape or CD.  The hospital will not be responsible for storage, release or maintenance of the picture, image, tape or CD.    7.   I consent to the presence of a  or observers in the operating room as deemed necessary by my physician or their designees.    8.   I recognize that in the event my procedure results in extended X-Ray/fluoroscopy time, I may develop a skin reaction.    9. If I have a Do Not  Attempt Resuscitation (DNAR) order in place, that status will be suspended while in the operating room, procedural suite, and during the recovery period unless otherwise explicitly stated by me (or a person authorized to consent on my behalf). The surgeon or my attending physician will determine when the applicable recovery period ends for purposes of reinstating the DNAR order.  10. Patients having a sterilization procedure: I understand that if the procedure is successful the results will be permanent and it will therefore be impossible for me to inseminate, conceive, or bear children.  I also understand that the procedure is intended to result in sterility, although the result has not been guaranteed.   11. I acknowledge that my physician has explained sedation/analgesia administration to me including the risk and benefits I consent to the administration of sedation/analgesia as may be necessary or desirable in the judgment of my physician.    I CERTIFY THAT I HAVE READ AND FULLY UNDERSTAND THE ABOVE CONSENT TO OPERATION and/or OTHER PROCEDURE.     ____________________________________  _________________________________        ______________________________  Signature of Patient    Signature of Responsible Person                Printed Name of Responsible Person                                      ____________________________________  _____________________________                ________________________________  Signature of Witness        Date  Time         Relationship to Patient    STATEMENT OF PHYSICIAN My signature below affirms that prior to the time of the procedure; I have explained to the patient and/or his/her legal representative, the risks and benefits involved in the proposed treatment and any reasonable alternative to the proposed treatment. I have also explained the risks and benefits involved in refusal of the proposed treatment and alternatives to the proposed treatment and have answered the  patient's questions. If I have a significant financial interest in a co-management agreement or a significant financial interest in any product or implant, or other significant relationship used in this procedure/surgery, I have disclosed this and had a discussion with my patient.     _____________________________________________________              _____________________________  (Signature of Physician)                                                                                         (Date)                                   (Time)  Patient Name: Min Munoz Aisha      : 10/3/1960      Printed: 2024     Medical Record #: [id number]                                      Page 1 of 1

## (undated) NOTE — LETTER
4/7/2025          To Whom It May Concern:    Min Leonard is currently under my medical care and may not return to work at this time.  His return to work is to be determined. He needs to follow up with an orthopedic specialist. He is not able to bear weight on his right leg at this time.     If you require additional information please contact our office.        Sincerely,         FELY Liu          Document generated by:  FEYL Liu